# Patient Record
Sex: FEMALE | Race: ASIAN | NOT HISPANIC OR LATINO
[De-identification: names, ages, dates, MRNs, and addresses within clinical notes are randomized per-mention and may not be internally consistent; named-entity substitution may affect disease eponyms.]

---

## 2017-05-22 ENCOUNTER — RESULT CHARGE (OUTPATIENT)
Age: 47
End: 2017-05-22

## 2017-05-22 ENCOUNTER — APPOINTMENT (OUTPATIENT)
Dept: ENDOCRINOLOGY | Facility: CLINIC | Age: 47
End: 2017-05-22

## 2017-05-22 VITALS
DIASTOLIC BLOOD PRESSURE: 80 MMHG | WEIGHT: 128 LBS | OXYGEN SATURATION: 99 % | RESPIRATION RATE: 16 BRPM | BODY MASS INDEX: 23.55 KG/M2 | SYSTOLIC BLOOD PRESSURE: 120 MMHG | HEART RATE: 102 BPM | HEIGHT: 62 IN

## 2017-05-22 LAB — GLUCOSE BLDC GLUCOMTR-MCNC: 260

## 2017-06-13 ENCOUNTER — MED ADMIN CHARGE (OUTPATIENT)
Age: 47
End: 2017-06-13

## 2017-06-13 ENCOUNTER — APPOINTMENT (OUTPATIENT)
Dept: ENDOCRINOLOGY | Facility: CLINIC | Age: 47
End: 2017-06-13

## 2017-06-13 VITALS
BODY MASS INDEX: 24.29 KG/M2 | WEIGHT: 132 LBS | SYSTOLIC BLOOD PRESSURE: 120 MMHG | DIASTOLIC BLOOD PRESSURE: 80 MMHG | HEIGHT: 62 IN

## 2017-06-20 ENCOUNTER — APPOINTMENT (OUTPATIENT)
Dept: ENDOCRINOLOGY | Facility: CLINIC | Age: 47
End: 2017-06-20

## 2017-12-21 ENCOUNTER — APPOINTMENT (OUTPATIENT)
Dept: ENDOCRINOLOGY | Facility: CLINIC | Age: 47
End: 2017-12-21
Payer: MEDICAID

## 2017-12-21 VITALS
DIASTOLIC BLOOD PRESSURE: 80 MMHG | SYSTOLIC BLOOD PRESSURE: 120 MMHG | BODY MASS INDEX: 24.66 KG/M2 | HEIGHT: 62 IN | WEIGHT: 134 LBS

## 2017-12-21 LAB — GLUCOSE BLDC GLUCOMTR-MCNC: 282

## 2017-12-21 PROCEDURE — 99214 OFFICE O/P EST MOD 30 MIN: CPT | Mod: 25

## 2017-12-21 PROCEDURE — 82962 GLUCOSE BLOOD TEST: CPT

## 2018-02-02 LAB
ALBUMIN SERPL ELPH-MCNC: 4.1 G/DL
ALP BLD-CCNC: 58 U/L
ALT SERPL-CCNC: 17 U/L
ANION GAP SERPL CALC-SCNC: 9 MMOL/L
AST SERPL-CCNC: 17 U/L
BASOPHILS # BLD AUTO: 0.02 K/UL
BASOPHILS NFR BLD AUTO: 0.3 %
BILIRUB SERPL-MCNC: 0.2 MG/DL
BUN SERPL-MCNC: 22 MG/DL
CALCIUM SERPL-MCNC: 9.3 MG/DL
CHLORIDE SERPL-SCNC: 101 MMOL/L
CHOLEST SERPL-MCNC: 167 MG/DL
CHOLEST/HDLC SERPL: 5.2 RATIO
CO2 SERPL-SCNC: 28 MMOL/L
CREAT SERPL-MCNC: 0.78 MG/DL
CREAT SPEC-SCNC: 98 MG/DL
EOSINOPHIL # BLD AUTO: 0.35 K/UL
EOSINOPHIL NFR BLD AUTO: 5.3 %
GLUCOSE SERPL-MCNC: 170 MG/DL
HBA1C MFR BLD HPLC: 11.9 %
HCT VFR BLD CALC: 39.7 %
HDLC SERPL-MCNC: 32 MG/DL
HGB BLD-MCNC: 12.1 G/DL
IMM GRANULOCYTES NFR BLD AUTO: 0.3 %
LDLC SERPL CALC-MCNC: 62 MG/DL
LYMPHOCYTES # BLD AUTO: 1.81 K/UL
LYMPHOCYTES NFR BLD AUTO: 27.6 %
MAN DIFF?: NORMAL
MCHC RBC-ENTMCNC: 25.6 PG
MCHC RBC-ENTMCNC: 30.5 GM/DL
MCV RBC AUTO: 83.9 FL
MICROALBUMIN 24H UR DL<=1MG/L-MCNC: 0.8 MG/DL
MICROALBUMIN/CREAT 24H UR-RTO: 8 MG/G
MONOCYTES # BLD AUTO: 0.44 K/UL
MONOCYTES NFR BLD AUTO: 6.7 %
NEUTROPHILS # BLD AUTO: 3.92 K/UL
NEUTROPHILS NFR BLD AUTO: 59.8 %
PLATELET # BLD AUTO: 262 K/UL
POTASSIUM SERPL-SCNC: 4.2 MMOL/L
PROT SERPL-MCNC: 7.3 G/DL
RBC # BLD: 4.73 M/UL
RBC # FLD: 15 %
SODIUM SERPL-SCNC: 138 MMOL/L
TRIGL SERPL-MCNC: 367 MG/DL
TSH SERPL-ACNC: 2.61 UIU/ML
WBC # FLD AUTO: 6.56 K/UL

## 2018-02-06 ENCOUNTER — APPOINTMENT (OUTPATIENT)
Dept: ENDOCRINOLOGY | Facility: CLINIC | Age: 48
End: 2018-02-06

## 2018-02-20 ENCOUNTER — APPOINTMENT (OUTPATIENT)
Dept: ENDOCRINOLOGY | Facility: CLINIC | Age: 48
End: 2018-02-20
Payer: MEDICAID

## 2018-02-20 VITALS
WEIGHT: 132 LBS | BODY MASS INDEX: 24.29 KG/M2 | HEIGHT: 62 IN | SYSTOLIC BLOOD PRESSURE: 108 MMHG | DIASTOLIC BLOOD PRESSURE: 80 MMHG

## 2018-02-20 LAB — GLUCOSE BLDC GLUCOMTR-MCNC: 302

## 2018-02-20 PROCEDURE — 99214 OFFICE O/P EST MOD 30 MIN: CPT | Mod: 25

## 2018-02-20 PROCEDURE — 82962 GLUCOSE BLOOD TEST: CPT

## 2018-05-08 ENCOUNTER — MEDICATION RENEWAL (OUTPATIENT)
Age: 48
End: 2018-05-08

## 2018-05-10 ENCOUNTER — EMERGENCY (EMERGENCY)
Facility: HOSPITAL | Age: 48
LOS: 1 days | Discharge: HOME | End: 2018-05-10
Attending: EMERGENCY MEDICINE | Admitting: EMERGENCY MEDICINE
Payer: MEDICAID

## 2018-05-10 VITALS
DIASTOLIC BLOOD PRESSURE: 80 MMHG | RESPIRATION RATE: 18 BRPM | SYSTOLIC BLOOD PRESSURE: 148 MMHG | OXYGEN SATURATION: 100 % | TEMPERATURE: 98 F

## 2018-05-10 VITALS
TEMPERATURE: 98 F | SYSTOLIC BLOOD PRESSURE: 125 MMHG | HEART RATE: 90 BPM | RESPIRATION RATE: 18 BRPM | OXYGEN SATURATION: 100 % | DIASTOLIC BLOOD PRESSURE: 81 MMHG

## 2018-05-10 LAB
ALBUMIN SERPL ELPH-MCNC: 3.9 G/DL — SIGNIFICANT CHANGE UP (ref 3.3–5)
ALP SERPL-CCNC: 43 U/L — SIGNIFICANT CHANGE UP (ref 40–120)
ALT FLD-CCNC: 31 U/L — SIGNIFICANT CHANGE UP (ref 4–33)
APPEARANCE UR: CLEAR — SIGNIFICANT CHANGE UP
AST SERPL-CCNC: 33 U/L — HIGH (ref 4–32)
BASE EXCESS BLDV CALC-SCNC: 2.7 MMOL/L — SIGNIFICANT CHANGE UP
BASOPHILS # BLD AUTO: 0.03 K/UL — SIGNIFICANT CHANGE UP (ref 0–0.2)
BASOPHILS NFR BLD AUTO: 0.6 % — SIGNIFICANT CHANGE UP (ref 0–2)
BILIRUB SERPL-MCNC: < 0.2 MG/DL — LOW (ref 0.2–1.2)
BILIRUB UR-MCNC: NEGATIVE — SIGNIFICANT CHANGE UP
BLOOD GAS VENOUS - CREATININE: 0.61 MG/DL — SIGNIFICANT CHANGE UP (ref 0.5–1.3)
BLOOD UR QL VISUAL: NEGATIVE — SIGNIFICANT CHANGE UP
BUN SERPL-MCNC: 12 MG/DL — SIGNIFICANT CHANGE UP (ref 7–23)
CALCIUM SERPL-MCNC: 9.1 MG/DL — SIGNIFICANT CHANGE UP (ref 8.4–10.5)
CHLORIDE BLDV-SCNC: 108 MMOL/L — SIGNIFICANT CHANGE UP (ref 96–108)
CHLORIDE SERPL-SCNC: 101 MMOL/L — SIGNIFICANT CHANGE UP (ref 98–107)
CO2 SERPL-SCNC: 24 MMOL/L — SIGNIFICANT CHANGE UP (ref 22–31)
COLOR SPEC: SIGNIFICANT CHANGE UP
CREAT SERPL-MCNC: 0.6 MG/DL — SIGNIFICANT CHANGE UP (ref 0.5–1.3)
EOSINOPHIL # BLD AUTO: 0.12 K/UL — SIGNIFICANT CHANGE UP (ref 0–0.5)
EOSINOPHIL NFR BLD AUTO: 2.4 % — SIGNIFICANT CHANGE UP (ref 0–6)
GAS PNL BLDV: 139 MMOL/L — SIGNIFICANT CHANGE UP (ref 136–146)
GLUCOSE BLDV-MCNC: 134 — HIGH (ref 70–99)
GLUCOSE SERPL-MCNC: 140 MG/DL — HIGH (ref 70–99)
GLUCOSE UR-MCNC: >1000 — SIGNIFICANT CHANGE UP
HCO3 BLDV-SCNC: 26 MMOL/L — SIGNIFICANT CHANGE UP (ref 20–27)
HCT VFR BLD CALC: 32.6 % — LOW (ref 34.5–45)
HCT VFR BLDV CALC: 32.7 % — LOW (ref 34.5–45)
HGB BLD-MCNC: 10.2 G/DL — LOW (ref 11.5–15.5)
HGB BLDV-MCNC: 10.6 G/DL — LOW (ref 11.5–15.5)
IMM GRANULOCYTES # BLD AUTO: 0.01 # — SIGNIFICANT CHANGE UP
IMM GRANULOCYTES NFR BLD AUTO: 0.2 % — SIGNIFICANT CHANGE UP (ref 0–1.5)
KETONES UR-MCNC: NEGATIVE — SIGNIFICANT CHANGE UP
LACTATE BLDV-MCNC: 1.1 MMOL/L — SIGNIFICANT CHANGE UP (ref 0.5–2)
LEUKOCYTE ESTERASE UR-ACNC: HIGH
LYMPHOCYTES # BLD AUTO: 1.69 K/UL — SIGNIFICANT CHANGE UP (ref 1–3.3)
LYMPHOCYTES # BLD AUTO: 33.2 % — SIGNIFICANT CHANGE UP (ref 13–44)
MCHC RBC-ENTMCNC: 25.1 PG — LOW (ref 27–34)
MCHC RBC-ENTMCNC: 31.3 % — LOW (ref 32–36)
MCV RBC AUTO: 80.3 FL — SIGNIFICANT CHANGE UP (ref 80–100)
MONOCYTES # BLD AUTO: 0.66 K/UL — SIGNIFICANT CHANGE UP (ref 0–0.9)
MONOCYTES NFR BLD AUTO: 13 % — SIGNIFICANT CHANGE UP (ref 2–14)
MUCOUS THREADS # UR AUTO: SIGNIFICANT CHANGE UP
NEUTROPHILS # BLD AUTO: 2.58 K/UL — SIGNIFICANT CHANGE UP (ref 1.8–7.4)
NEUTROPHILS NFR BLD AUTO: 50.6 % — SIGNIFICANT CHANGE UP (ref 43–77)
NITRITE UR-MCNC: NEGATIVE — SIGNIFICANT CHANGE UP
NON-SQ EPI CELLS # UR AUTO: <1 — SIGNIFICANT CHANGE UP
NRBC # FLD: 0 — SIGNIFICANT CHANGE UP
OB PNL STL: NEGATIVE — SIGNIFICANT CHANGE UP
PCO2 BLDV: 51 MMHG — SIGNIFICANT CHANGE UP (ref 41–51)
PH BLDV: 7.35 PH — SIGNIFICANT CHANGE UP (ref 7.32–7.43)
PH UR: 6.5 — SIGNIFICANT CHANGE UP (ref 4.6–8)
PLATELET # BLD AUTO: 235 K/UL — SIGNIFICANT CHANGE UP (ref 150–400)
PMV BLD: 11.1 FL — SIGNIFICANT CHANGE UP (ref 7–13)
PO2 BLDV: 31 MMHG — LOW (ref 35–40)
POTASSIUM BLDV-SCNC: 3.4 MMOL/L — SIGNIFICANT CHANGE UP (ref 3.4–4.5)
POTASSIUM SERPL-MCNC: 4 MMOL/L — SIGNIFICANT CHANGE UP (ref 3.5–5.3)
POTASSIUM SERPL-SCNC: 4 MMOL/L — SIGNIFICANT CHANGE UP (ref 3.5–5.3)
PROT SERPL-MCNC: 7.4 G/DL — SIGNIFICANT CHANGE UP (ref 6–8.3)
PROT UR-MCNC: 10 MG/DL — SIGNIFICANT CHANGE UP
RBC # BLD: 4.06 M/UL — SIGNIFICANT CHANGE UP (ref 3.8–5.2)
RBC # FLD: 14.5 % — SIGNIFICANT CHANGE UP (ref 10.3–14.5)
RBC CASTS # UR COMP ASSIST: SIGNIFICANT CHANGE UP (ref 0–?)
SAO2 % BLDV: 51.9 % — LOW (ref 60–85)
SODIUM SERPL-SCNC: 138 MMOL/L — SIGNIFICANT CHANGE UP (ref 135–145)
SP GR SPEC: 1.02 — SIGNIFICANT CHANGE UP (ref 1–1.04)
SQUAMOUS # UR AUTO: SIGNIFICANT CHANGE UP
UROBILINOGEN FLD QL: NORMAL MG/DL — SIGNIFICANT CHANGE UP
WBC # BLD: 5.09 K/UL — SIGNIFICANT CHANGE UP (ref 3.8–10.5)
WBC # FLD AUTO: 5.09 K/UL — SIGNIFICANT CHANGE UP (ref 3.8–10.5)
WBC UR QL: SIGNIFICANT CHANGE UP (ref 0–?)

## 2018-05-10 PROCEDURE — 99284 EMERGENCY DEPT VISIT MOD MDM: CPT

## 2018-05-10 PROCEDURE — 76705 ECHO EXAM OF ABDOMEN: CPT | Mod: 26

## 2018-05-10 RX ORDER — SODIUM CHLORIDE 9 MG/ML
1000 INJECTION INTRAMUSCULAR; INTRAVENOUS; SUBCUTANEOUS ONCE
Qty: 0 | Refills: 0 | Status: COMPLETED | OUTPATIENT
Start: 2018-05-10 | End: 2018-05-10

## 2018-05-10 RX ORDER — ONDANSETRON 8 MG/1
4 TABLET, FILM COATED ORAL ONCE
Qty: 0 | Refills: 0 | Status: COMPLETED | OUTPATIENT
Start: 2018-05-10 | End: 2018-05-10

## 2018-05-10 RX ORDER — OMEPRAZOLE 10 MG/1
2 CAPSULE, DELAYED RELEASE ORAL
Qty: 84 | Refills: 0
Start: 2018-05-10 | End: 2018-06-20

## 2018-05-10 RX ORDER — FAMOTIDINE 10 MG/ML
1 INJECTION INTRAVENOUS
Qty: 40 | Refills: 0
Start: 2018-05-10 | End: 2018-05-29

## 2018-05-10 RX ORDER — FAMOTIDINE 10 MG/ML
20 INJECTION INTRAVENOUS ONCE
Qty: 0 | Refills: 0 | Status: COMPLETED | OUTPATIENT
Start: 2018-05-10 | End: 2018-05-10

## 2018-05-10 RX ADMIN — SODIUM CHLORIDE 1000 MILLILITER(S): 9 INJECTION INTRAMUSCULAR; INTRAVENOUS; SUBCUTANEOUS at 15:19

## 2018-05-10 RX ADMIN — ONDANSETRON 4 MILLIGRAM(S): 8 TABLET, FILM COATED ORAL at 15:19

## 2018-05-10 RX ADMIN — FAMOTIDINE 20 MILLIGRAM(S): 10 INJECTION INTRAVENOUS at 15:19

## 2018-05-10 NOTE — ED PROVIDER NOTE - MEDICAL DECISION MAKING DETAILS
Pratibha: Metabolic syndrome, epig pain and dark stool diarrhea. Not on ASA. TTP epig and RUQ. NAD. Afebrile. Vital signs unremarkable. DDx: PUD, gallstones, colitis. Plan: labs, US. If no gallstones, CT for transverse colitis.

## 2018-05-10 NOTE — ED PROVIDER NOTE - CARE PLAN
Principal Discharge DX:	Abdominal pain Principal Discharge DX:	Abdominal pain  Assessment and plan of treatment:	Follow up with Gastroenterology this week for outpatient endoscopy. Take Pepcid 20 mg 30 minutes before meals 2x/day.  Take over the counter Maalox as needed. Start Omeprazole 40 mg once a day. Stop eating spicy and acidic foods. Eat smaller meals more frequently. Discontinue taking aleve/ ibuprofen. Take Tylenol 650mg 1 tab every 4-6 hours.  Worsening pain, new fever, chills, nausea, vomiting, new chest pain/shortness of breath return to ER

## 2018-05-10 NOTE — ED ADULT TRIAGE NOTE - CHIEF COMPLAINT QUOTE
Pt with co fever and body aches since monday.  Pt reports dark stool since Tuesday with abdominal pain if she eats. Pts finger stick 227 in triage.

## 2018-05-10 NOTE — ED PROVIDER NOTE - ATTENDING CONTRIBUTION TO CARE
I performed a face-to-face evaluation of the patient and performed a history and physical examination. I agree with the history and physical examination.

## 2018-05-10 NOTE — ED PROVIDER NOTE - OBJECTIVE STATEMENT
47 yo F with pmhx IDDM, HTN, HLD, anxiety, presents to the ED c/o diffuse abdominal cramping, worse in the epigastric region, worse after eating, tactile fevers, improved with Tylenol, chills, body aches, nausea, 2 episodes of NBNB vomiting, and few episodes of diarrhea that are dark/ black in color. Pt reports taking aleve, every day for pain, denies asa use. Denies cp ,sob, recent sick contacts, weakness, urinary symptoms.   Pt had endoscopy 7 months ago, normal study as per study.   Last Colonoscopy 2012.

## 2018-05-10 NOTE — ED PROVIDER NOTE - PLAN OF CARE
Follow up with Gastroenterology this week for outpatient endoscopy. Take Pepcid 20 mg 30 minutes before meals 2x/day.  Take over the counter Maalox as needed. Start Omeprazole 40 mg once a day. Stop eating spicy and acidic foods. Eat smaller meals more frequently. Discontinue taking aleve/ ibuprofen. Take Tylenol 650mg 1 tab every 4-6 hours.  Worsening pain, new fever, chills, nausea, vomiting, new chest pain/shortness of breath return to ER

## 2018-05-29 ENCOUNTER — APPOINTMENT (OUTPATIENT)
Dept: GASTROENTEROLOGY | Facility: CLINIC | Age: 48
End: 2018-05-29
Payer: MEDICAID

## 2018-05-29 VITALS
SYSTOLIC BLOOD PRESSURE: 118 MMHG | HEIGHT: 62 IN | RESPIRATION RATE: 14 BRPM | HEART RATE: 98 BPM | WEIGHT: 134 LBS | TEMPERATURE: 98.7 F | DIASTOLIC BLOOD PRESSURE: 84 MMHG | BODY MASS INDEX: 24.66 KG/M2

## 2018-05-29 PROCEDURE — 99203 OFFICE O/P NEW LOW 30 MIN: CPT

## 2018-06-19 ENCOUNTER — FORM ENCOUNTER (OUTPATIENT)
Age: 48
End: 2018-06-19

## 2018-06-20 ENCOUNTER — OUTPATIENT (OUTPATIENT)
Dept: OUTPATIENT SERVICES | Facility: HOSPITAL | Age: 48
LOS: 1 days | End: 2018-06-20
Payer: MEDICAID

## 2018-06-20 ENCOUNTER — APPOINTMENT (OUTPATIENT)
Dept: NUCLEAR MEDICINE | Facility: HOSPITAL | Age: 48
End: 2018-06-20
Payer: MEDICAID

## 2018-06-20 DIAGNOSIS — E10.65 TYPE 1 DIABETES MELLITUS WITH HYPERGLYCEMIA: ICD-10-CM

## 2018-06-20 PROCEDURE — 78264 GASTRIC EMPTYING IMG STUDY: CPT | Mod: 26,GC

## 2018-06-20 PROCEDURE — A9541: CPT

## 2018-06-20 PROCEDURE — 78264 GASTRIC EMPTYING IMG STUDY: CPT

## 2018-07-09 ENCOUNTER — APPOINTMENT (OUTPATIENT)
Dept: ENDOCRINOLOGY | Facility: CLINIC | Age: 48
End: 2018-07-09
Payer: MEDICAID

## 2018-07-09 VITALS
HEART RATE: 95 BPM | HEIGHT: 62 IN | OXYGEN SATURATION: 98 % | RESPIRATION RATE: 16 BRPM | DIASTOLIC BLOOD PRESSURE: 73 MMHG | SYSTOLIC BLOOD PRESSURE: 104 MMHG | WEIGHT: 133 LBS | TEMPERATURE: 99 F | BODY MASS INDEX: 24.48 KG/M2

## 2018-07-09 LAB — GLUCOSE BLDC GLUCOMTR-MCNC: 271

## 2018-07-09 PROCEDURE — 82962 GLUCOSE BLOOD TEST: CPT

## 2018-07-09 PROCEDURE — 99214 OFFICE O/P EST MOD 30 MIN: CPT | Mod: 25

## 2018-07-10 LAB
ALBUMIN SERPL ELPH-MCNC: 4.5 G/DL
ALP BLD-CCNC: 54 U/L
ALT SERPL-CCNC: 44 U/L
ANION GAP SERPL CALC-SCNC: 15 MMOL/L
AST SERPL-CCNC: 30 U/L
BILIRUB SERPL-MCNC: <0.2 MG/DL
BUN SERPL-MCNC: 17 MG/DL
CALCIUM SERPL-MCNC: 9.6 MG/DL
CHLORIDE SERPL-SCNC: 103 MMOL/L
CHOLEST SERPL-MCNC: 179 MG/DL
CHOLEST/HDLC SERPL: 6 RATIO
CO2 SERPL-SCNC: 21 MMOL/L
CREAT SERPL-MCNC: 0.79 MG/DL
GLUCOSE SERPL-MCNC: 229 MG/DL
HBA1C MFR BLD HPLC: 10.2 %
HDLC SERPL-MCNC: 30 MG/DL
LDLC SERPL CALC-MCNC: NORMAL
POTASSIUM SERPL-SCNC: 4.2 MMOL/L
PROT SERPL-MCNC: 7.9 G/DL
SODIUM SERPL-SCNC: 139 MMOL/L
TRIGL SERPL-MCNC: 692 MG/DL

## 2018-08-07 ENCOUNTER — APPOINTMENT (OUTPATIENT)
Dept: GASTROENTEROLOGY | Facility: CLINIC | Age: 48
End: 2018-08-07
Payer: MEDICAID

## 2018-08-07 VITALS
TEMPERATURE: 98.8 F | SYSTOLIC BLOOD PRESSURE: 110 MMHG | OXYGEN SATURATION: 98 % | HEIGHT: 62 IN | HEART RATE: 94 BPM | BODY MASS INDEX: 24.48 KG/M2 | DIASTOLIC BLOOD PRESSURE: 74 MMHG | WEIGHT: 133 LBS | RESPIRATION RATE: 16 BRPM

## 2018-08-07 PROCEDURE — 99214 OFFICE O/P EST MOD 30 MIN: CPT

## 2018-08-07 RX ORDER — OMEPRAZOLE 40 MG/1
40 CAPSULE, DELAYED RELEASE ORAL
Qty: 30 | Refills: 3 | Status: ACTIVE | COMMUNITY
Start: 2018-08-07 | End: 1900-01-01

## 2018-08-07 RX ORDER — METOCLOPRAMIDE 5 MG/1
5 TABLET ORAL 4 TIMES DAILY
Qty: 120 | Refills: 0 | Status: ACTIVE | COMMUNITY
Start: 2018-08-07 | End: 1900-01-01

## 2018-12-11 ENCOUNTER — APPOINTMENT (OUTPATIENT)
Dept: ENDOCRINOLOGY | Facility: CLINIC | Age: 48
End: 2018-12-11
Payer: MEDICAID

## 2018-12-11 ENCOUNTER — APPOINTMENT (OUTPATIENT)
Dept: GASTROENTEROLOGY | Facility: CLINIC | Age: 48
End: 2018-12-11
Payer: MEDICAID

## 2018-12-11 VITALS
TEMPERATURE: 98.5 F | HEART RATE: 92 BPM | DIASTOLIC BLOOD PRESSURE: 90 MMHG | OXYGEN SATURATION: 99 % | WEIGHT: 138 LBS | RESPIRATION RATE: 16 BRPM | SYSTOLIC BLOOD PRESSURE: 131 MMHG | HEIGHT: 62 IN | BODY MASS INDEX: 25.4 KG/M2

## 2018-12-11 VITALS
DIASTOLIC BLOOD PRESSURE: 81 MMHG | WEIGHT: 139 LBS | SYSTOLIC BLOOD PRESSURE: 163 MMHG | HEART RATE: 92 BPM | BODY MASS INDEX: 25.58 KG/M2 | RESPIRATION RATE: 14 BRPM | TEMPERATURE: 98.1 F | HEIGHT: 62 IN | OXYGEN SATURATION: 98 %

## 2018-12-11 DIAGNOSIS — Z87.19 PERSONAL HISTORY OF OTHER DISEASES OF THE DIGESTIVE SYSTEM: ICD-10-CM

## 2018-12-11 DIAGNOSIS — E78.5 HYPERLIPIDEMIA, UNSPECIFIED: ICD-10-CM

## 2018-12-11 DIAGNOSIS — R19.4 CHANGE IN BOWEL HABIT: ICD-10-CM

## 2018-12-11 DIAGNOSIS — M79.641 PAIN IN RIGHT HAND: ICD-10-CM

## 2018-12-11 DIAGNOSIS — R14.0 ABDOMINAL DISTENSION (GASEOUS): ICD-10-CM

## 2018-12-11 DIAGNOSIS — E10.65 TYPE 1 DIABETES MELLITUS WITH HYPERGLYCEMIA: ICD-10-CM

## 2018-12-11 DIAGNOSIS — I10 ESSENTIAL (PRIMARY) HYPERTENSION: ICD-10-CM

## 2018-12-11 LAB — GLUCOSE BLDC GLUCOMTR-MCNC: 202

## 2018-12-11 PROCEDURE — 99215 OFFICE O/P EST HI 40 MIN: CPT

## 2018-12-11 PROCEDURE — 99204 OFFICE O/P NEW MOD 45 MIN: CPT | Mod: 25

## 2018-12-11 PROCEDURE — 82962 GLUCOSE BLOOD TEST: CPT

## 2018-12-11 RX ORDER — CANAGLIFLOZIN 100 MG/1
100 TABLET, FILM COATED ORAL ONCE
Qty: 30 | Refills: 0 | Status: COMPLETED | COMMUNITY
Start: 2017-05-22 | End: 2018-12-11

## 2018-12-11 RX ORDER — FENOFIBRATE 54 MG/1
54 TABLET ORAL DAILY
Qty: 90 | Refills: 0 | Status: COMPLETED | COMMUNITY
Start: 2018-02-20 | End: 2018-12-11

## 2018-12-11 RX ORDER — EMPAGLIFLOZIN 10 MG/1
10 TABLET, FILM COATED ORAL
Qty: 30 | Refills: 3 | Status: COMPLETED | COMMUNITY
Start: 2017-06-13 | End: 2018-12-11

## 2018-12-11 RX ORDER — CANAGLIFLOZIN 100 MG/1
100 TABLET, FILM COATED ORAL
Qty: 90 | Refills: 0 | Status: COMPLETED | COMMUNITY
Start: 2017-06-15 | End: 2018-12-11

## 2018-12-11 RX ORDER — CANAGLIFLOZIN 100 MG/1
100 TABLET, FILM COATED ORAL
Qty: 90 | Refills: 0 | Status: COMPLETED | COMMUNITY
Start: 2018-07-09 | End: 2018-12-11

## 2018-12-11 RX ORDER — EMPAGLIFLOZIN 10 MG/1
10 TABLET, FILM COATED ORAL
Qty: 30 | Refills: 2 | Status: COMPLETED | COMMUNITY
Start: 2018-02-20 | End: 2018-12-11

## 2018-12-11 RX ORDER — CANAGLIFLOZIN 100 MG/1
100 TABLET, FILM COATED ORAL
Qty: 90 | Refills: 0 | Status: COMPLETED | COMMUNITY
Start: 2017-12-21 | End: 2018-12-11

## 2018-12-11 RX ORDER — METFORMIN ER 500 MG 500 MG/1
500 TABLET ORAL
Qty: 180 | Refills: 0 | Status: COMPLETED | COMMUNITY
Start: 2018-02-20 | End: 2018-12-11

## 2018-12-15 ENCOUNTER — RX RENEWAL (OUTPATIENT)
Age: 48
End: 2018-12-15

## 2018-12-15 DIAGNOSIS — E55.9 VITAMIN D DEFICIENCY, UNSPECIFIED: ICD-10-CM

## 2018-12-15 RX ORDER — BLOOD-GLUCOSE METER
W/DEVICE KIT MISCELLANEOUS
Qty: 1 | Refills: 0 | Status: COMPLETED | COMMUNITY
Start: 2017-05-22 | End: 2018-12-15

## 2018-12-15 RX ORDER — OMEPRAZOLE 20 MG/1
20 CAPSULE, DELAYED RELEASE ORAL DAILY
Qty: 10 | Refills: 3 | Status: COMPLETED | COMMUNITY
Start: 2018-05-29 | End: 2018-12-15

## 2018-12-15 RX ORDER — INSULIN GLARGINE 100 [IU]/ML
100 INJECTION, SOLUTION SUBCUTANEOUS DAILY
Qty: 15 | Refills: 1 | Status: ACTIVE | COMMUNITY
Start: 2018-05-08

## 2018-12-15 RX ORDER — ALCOHOL ANTISEPTIC PADS
PADS, MEDICATED (EA) TOPICAL
Qty: 3 | Refills: 3 | Status: ACTIVE | COMMUNITY
Start: 2018-02-20

## 2018-12-15 RX ORDER — FAMOTIDINE 10 MG/1
10 TABLET, FILM COATED ORAL
Qty: 30 | Refills: 2 | Status: COMPLETED | COMMUNITY
Start: 2018-05-29 | End: 2018-12-15

## 2018-12-15 RX ORDER — METFORMIN ER 500 MG 500 MG/1
500 TABLET ORAL
Qty: 180 | Refills: 0 | Status: ACTIVE | COMMUNITY
Start: 2018-07-09

## 2018-12-15 RX ORDER — FENOFIBRATE 54 MG/1
54 TABLET ORAL DAILY
Qty: 90 | Refills: 0 | Status: ACTIVE | COMMUNITY
Start: 2018-07-09

## 2018-12-15 RX ORDER — LISINOPRIL 5 MG/1
5 TABLET ORAL DAILY
Qty: 90 | Refills: 0 | Status: ACTIVE | COMMUNITY
Start: 2018-02-20

## 2018-12-15 RX ORDER — FLASH GLUCOSE SENSOR
KIT MISCELLANEOUS
Qty: 10 | Refills: 3 | Status: ACTIVE | COMMUNITY
Start: 2018-07-09

## 2018-12-15 RX ORDER — FLASH GLUCOSE SENSOR
KIT MISCELLANEOUS
Qty: 1 | Refills: 0 | Status: ACTIVE | COMMUNITY
Start: 2018-07-09

## 2018-12-15 RX ORDER — PEN NEEDLE, DIABETIC 32 GX 1/6"
32G X 4 MM NEEDLE, DISPOSABLE MISCELLANEOUS
Qty: 4 | Refills: 3 | Status: COMPLETED | COMMUNITY
Start: 2018-02-20 | End: 2018-12-15

## 2018-12-15 RX ORDER — PEN NEEDLE, DIABETIC 32 GX 1/6"
32G X 4 MM NEEDLE, DISPOSABLE MISCELLANEOUS
Qty: 4 | Refills: 3 | Status: ACTIVE | COMMUNITY
Start: 2018-07-09

## 2018-12-15 RX ORDER — BLOOD SUGAR DIAGNOSTIC
STRIP MISCELLANEOUS 3 TIMES DAILY
Qty: 270 | Refills: 0 | Status: ACTIVE | COMMUNITY
Start: 2018-07-09

## 2018-12-20 ENCOUNTER — RX CHANGE (OUTPATIENT)
Age: 48
End: 2018-12-20

## 2018-12-20 LAB
ALBUMIN SERPL ELPH-MCNC: 4.5 G/DL
ALP BLD-CCNC: 64 U/L
ALT SERPL-CCNC: 80 U/L
ANION GAP SERPL CALC-SCNC: 12 MMOL/L
AST SERPL-CCNC: 50 U/L
BILIRUB DIRECT SERPL-MCNC: 0.1 MG/DL
BILIRUB INDIRECT SERPL-MCNC: 0.2 MG/DL
BILIRUB SERPL-MCNC: 0.3 MG/DL
BUN SERPL-MCNC: 15 MG/DL
CALCIUM SERPL-MCNC: 9.7 MG/DL
CHLORIDE SERPL-SCNC: 102 MMOL/L
CHOLEST SERPL-MCNC: 189 MG/DL
CHOLEST/HDLC SERPL: 5.4 RATIO
CK SERPL-CCNC: 171 U/L
CO2 SERPL-SCNC: 26 MMOL/L
CREAT SERPL-MCNC: 0.73 MG/DL
CREAT SPEC-SCNC: 113 MG/DL
FRUCTOSAMINE SERPL-MCNC: 390 UMOL/L
GLUCOSE BS SERPL-MCNC: 172 MG/DL
GLUCOSE SERPL-MCNC: 175 MG/DL
HBA1C MFR BLD HPLC: 10 %
HDLC SERPL-MCNC: 35 MG/DL
LDLC SERPL CALC-MCNC: 95 MG/DL
MICROALBUMIN 24H UR DL<=1MG/L-MCNC: 2.3 MG/DL
MICROALBUMIN/CREAT 24H UR-RTO: 20 MG/G
POTASSIUM SERPL-SCNC: 4.2 MMOL/L
PROT SERPL-MCNC: 7.4 G/DL
SODIUM SERPL-SCNC: 140 MMOL/L
T4 FREE SERPL-MCNC: 1.2 NG/DL
THYROPEROXIDASE AB SERPL IA-ACNC: <10 IU/ML
TRIGL SERPL-MCNC: 296 MG/DL
TSH SERPL-ACNC: 3.55 UIU/ML

## 2018-12-22 ENCOUNTER — RX RENEWAL (OUTPATIENT)
Age: 48
End: 2018-12-22

## 2018-12-22 RX ORDER — DAPAGLIFLOZIN 5 MG/1
5 TABLET, FILM COATED ORAL
Qty: 90 | Refills: 3 | Status: COMPLETED | COMMUNITY
Start: 2018-12-15 | End: 2018-12-22

## 2018-12-22 RX ORDER — EMPAGLIFLOZIN 10 MG/1
10 TABLET, FILM COATED ORAL
Qty: 90 | Refills: 3 | Status: COMPLETED | COMMUNITY
Start: 2018-12-20 | End: 2018-12-22

## 2019-01-14 ENCOUNTER — OTHER (OUTPATIENT)
Age: 49
End: 2019-01-14

## 2019-02-13 ENCOUNTER — RESULT REVIEW (OUTPATIENT)
Age: 49
End: 2019-02-13

## 2019-02-13 ENCOUNTER — OUTPATIENT (OUTPATIENT)
Dept: OUTPATIENT SERVICES | Facility: HOSPITAL | Age: 49
LOS: 1 days | End: 2019-02-13
Payer: MEDICAID

## 2019-02-13 DIAGNOSIS — K31.84 GASTROPARESIS: ICD-10-CM

## 2019-02-13 LAB — HCG UR QL: NEGATIVE — SIGNIFICANT CHANGE UP

## 2019-02-13 PROCEDURE — 43239 EGD BIOPSY SINGLE/MULTIPLE: CPT

## 2019-02-13 PROCEDURE — 88312 SPECIAL STAINS GROUP 1: CPT | Mod: 26

## 2019-02-13 PROCEDURE — 88305 TISSUE EXAM BY PATHOLOGIST: CPT

## 2019-02-13 PROCEDURE — 81025 URINE PREGNANCY TEST: CPT

## 2019-02-13 PROCEDURE — 88305 TISSUE EXAM BY PATHOLOGIST: CPT | Mod: 26

## 2019-02-13 PROCEDURE — 88312 SPECIAL STAINS GROUP 1: CPT

## 2019-03-12 ENCOUNTER — APPOINTMENT (OUTPATIENT)
Dept: GASTROENTEROLOGY | Facility: CLINIC | Age: 49
End: 2019-03-12
Payer: MEDICAID

## 2019-03-12 VITALS
RESPIRATION RATE: 14 BRPM | HEART RATE: 85 BPM | WEIGHT: 137 LBS | HEIGHT: 62 IN | DIASTOLIC BLOOD PRESSURE: 80 MMHG | SYSTOLIC BLOOD PRESSURE: 124 MMHG | TEMPERATURE: 98.4 F | BODY MASS INDEX: 25.21 KG/M2 | OXYGEN SATURATION: 98 %

## 2019-03-12 DIAGNOSIS — K31.84 GASTROPARESIS: ICD-10-CM

## 2019-03-12 DIAGNOSIS — K59.09 OTHER CONSTIPATION: ICD-10-CM

## 2019-03-12 PROCEDURE — 99214 OFFICE O/P EST MOD 30 MIN: CPT

## 2019-03-12 RX ORDER — POLYETHYLENE GLYCOL 3350 17 G/17G
17 POWDER, FOR SOLUTION ORAL DAILY
Qty: 1 | Refills: 5 | Status: ACTIVE | COMMUNITY
Start: 2019-03-12 | End: 1900-01-01

## 2019-03-12 RX ORDER — OMEPRAZOLE 40 MG/1
40 CAPSULE, DELAYED RELEASE ORAL
Qty: 30 | Refills: 3 | Status: ACTIVE | COMMUNITY
Start: 2019-03-12 | End: 1900-01-01

## 2019-03-12 NOTE — HISTORY OF PRESENT ILLNESS
[de-identified] : 48 year old female presents follow up. She states her symptoms are improved but still presents. She is working on lifestyle modifications and her blood glucose control.

## 2019-03-12 NOTE — ASSESSMENT
[FreeTextEntry1] : 48 year old female with chronic constipation  and bloating. + Gastroparesis confirmed on GE scan. HBA!C 10 on last testing. \par \par Plan:\par Small frequent meals \par PPI daily \par Reglan (patient refusing to try given possible side effects) \par Low fat meals\par better BG control

## 2019-03-12 NOTE — PHYSICAL EXAM
[General Appearance - Alert] : alert [General Appearance - In No Acute Distress] : in no acute distress [Sclera] : the sclera and conjunctiva were normal [PERRL With Normal Accommodation] : pupils were equal in size, round, and reactive to light [Extraocular Movements] : extraocular movements were intact [Outer Ear] : the ears and nose were normal in appearance [Oropharynx] : the oropharynx was normal [Neck Appearance] : the appearance of the neck was normal [Neck Cervical Mass (___cm)] : no neck mass was observed [Jugular Venous Distention Increased] : there was no jugular-venous distention [Thyroid Diffuse Enlargement] : the thyroid was not enlarged [Thyroid Nodule] : there were no palpable thyroid nodules [Auscultation Breath Sounds / Voice Sounds] : lungs were clear to auscultation bilaterally [Heart Rate And Rhythm] : heart rate was normal and rhythm regular [Heart Sounds] : normal S1 and S2 [Heart Sounds Gallop] : no gallops [Murmurs] : no murmurs [Heart Sounds Pericardial Friction Rub] : no pericardial rub [Bowel Sounds] : normal bowel sounds [Abdomen Soft] : soft [Abdomen Tenderness] : non-tender [Abdomen Mass (___ Cm)] : no abdominal mass palpated [No Spinal Tenderness] : no spinal tenderness [Nail Clubbing] : no clubbing  or cyanosis of the fingernails [] : no rash [Oriented To Time, Place, And Person] : oriented to person, place, and time [Impaired Insight] : insight and judgment were intact [Affect] : the affect was normal [FreeTextEntry1] : Distended

## 2019-04-16 ENCOUNTER — APPOINTMENT (OUTPATIENT)
Dept: ENDOCRINOLOGY | Facility: CLINIC | Age: 49
End: 2019-04-16
Payer: MEDICAID

## 2019-04-16 VITALS
WEIGHT: 137 LBS | DIASTOLIC BLOOD PRESSURE: 80 MMHG | TEMPERATURE: 98.3 F | RESPIRATION RATE: 16 BRPM | OXYGEN SATURATION: 98 % | SYSTOLIC BLOOD PRESSURE: 117 MMHG | HEIGHT: 62 IN | HEART RATE: 90 BPM | BODY MASS INDEX: 25.21 KG/M2

## 2019-04-16 DIAGNOSIS — E11.9 TYPE 2 DIABETES MELLITUS W/OUT COMPLICATIONS: ICD-10-CM

## 2019-04-16 LAB — GLUCOSE BLDC GLUCOMTR-MCNC: 267

## 2019-04-16 PROCEDURE — 99213 OFFICE O/P EST LOW 20 MIN: CPT | Mod: 25

## 2019-04-16 PROCEDURE — 82962 GLUCOSE BLOOD TEST: CPT

## 2019-04-16 NOTE — PHYSICAL EXAM
[Alert] : alert [No Acute Distress] : no acute distress [Normal Sclera/Conjunctiva] : normal sclera/conjunctiva [PERRL] : pupils equal, round and reactive to light [Normal Outer Ear/Nose] : the ears and nose were normal in appearance [Normal Hearing] : hearing was normal [No Neck Mass] : no neck mass was observed [No Respiratory Distress] : no respiratory distress [Normal Rate and Effort] : normal respiratory rhythm and effort [Normal PMI] : the apical impulse was normal [Normal Rate] : heart rate was normal  [Carotids Normal] : carotid pulses were normal with no bruits [Abdominal Aorta Normal] : the abdominal aorta was normal [No Motor Deficits] : the motor exam was normal [No Sensory Deficits] : the sensory exam was normal to light touch and pinprick [Normal Sensation on Monofilament Testing] : normal sensation on monofilament testing of lower extremities [de-identified] : Thyroid prominent irregular surface

## 2019-04-16 NOTE — DATA REVIEWED
[FreeTextEntry1] : The PPS is elevated, she could not get Jardiance or Invokana the insurance does not pay

## 2019-04-16 NOTE — HISTORY OF PRESENT ILLNESS
[FreeTextEntry1] : Doing well, unable to lose weight. She says she follows the diet and exercise. The POPS is elevated. She has seen the Ophthalmologist and Podiatrist

## 2019-04-16 NOTE — ASSESSMENT
[FreeTextEntry1] : Still poor diabetic control\par Will increase Steglatro 15 mg po QD\par Next visit if no improvement will add Trulicity\par Advised to see the Nutritionist

## 2019-05-14 ENCOUNTER — APPOINTMENT (OUTPATIENT)
Dept: GASTROENTEROLOGY | Facility: CLINIC | Age: 49
End: 2019-05-14

## 2019-06-11 ENCOUNTER — OTHER (OUTPATIENT)
Age: 49
End: 2019-06-11

## 2019-06-11 RX ORDER — FAMOTIDINE 20 MG/1
20 TABLET, FILM COATED ORAL
Qty: 30 | Refills: 3 | Status: ACTIVE | COMMUNITY
Start: 2018-08-07 | End: 1900-01-01

## 2019-07-23 ENCOUNTER — APPOINTMENT (OUTPATIENT)
Dept: ENDOCRINOLOGY | Facility: CLINIC | Age: 49
End: 2019-07-23

## 2019-10-28 ENCOUNTER — APPOINTMENT (OUTPATIENT)
Dept: ORTHOPEDIC SURGERY | Facility: CLINIC | Age: 49
End: 2019-10-28

## 2019-11-11 ENCOUNTER — APPOINTMENT (OUTPATIENT)
Dept: ORTHOPEDIC SURGERY | Facility: CLINIC | Age: 49
End: 2019-11-11
Payer: MEDICAID

## 2019-11-11 VITALS
SYSTOLIC BLOOD PRESSURE: 112 MMHG | WEIGHT: 135 LBS | HEART RATE: 94 BPM | BODY MASS INDEX: 24.84 KG/M2 | DIASTOLIC BLOOD PRESSURE: 76 MMHG | HEIGHT: 62 IN

## 2019-11-11 DIAGNOSIS — M75.101 UNSPECIFIED ROTATOR CUFF TEAR OR RUPTURE OF RIGHT SHOULDER, NOT SPECIFIED AS TRAUMATIC: ICD-10-CM

## 2019-11-11 DIAGNOSIS — E10.618 TYPE 1 DIABETES MELLITUS WITH OTHER DIABETIC ARTHROPATHY: ICD-10-CM

## 2019-11-11 DIAGNOSIS — M75.00 TYPE 1 DIABETES MELLITUS WITH OTHER DIABETIC ARTHROPATHY: ICD-10-CM

## 2019-11-11 PROCEDURE — 20610 DRAIN/INJ JOINT/BURSA W/O US: CPT | Mod: RT

## 2019-11-11 PROCEDURE — 99204 OFFICE O/P NEW MOD 45 MIN: CPT | Mod: 25

## 2019-11-11 PROCEDURE — 73030 X-RAY EXAM OF SHOULDER: CPT | Mod: RT

## 2019-11-11 RX ORDER — DICLOFENAC SODIUM AND MISOPROSTOL 75; 200 MG/1; UG/1
75-0.2 TABLET, DELAYED RELEASE ORAL
Qty: 60 | Refills: 1 | Status: ACTIVE | COMMUNITY
Start: 2019-11-11 | End: 1900-01-01

## 2019-11-11 NOTE — HISTORY OF PRESENT ILLNESS
[de-identified] : CC Right shoulder\par \par HPI 48 yo female left HD presents with gradual onset of 6 months to one year of activity related pain in the entire Right shoulder [without injury]. The pain is worse, and rated a 6 out of 10, described as aching sharp burning throbbing, with radiation to the entire shoulder. Not moving the shoulder makes the pain better and shoulder motion makes the pain worse. The patient reports associated symptoms of loss of range of motion locking. The patient + pain at night affecting sleep, - neck pain, and - similar pain previously.\par \par The patient has tried the following treatments:\par Activity modification	-\par Ice			-\par Nsaids    		-\par Physical Therapy  	-\par Cortisone Injection	-\par Arthroscopy/Surgery	-\par \par Review of Systems is positive for the above musculoskeletal symptoms and is otherwise non-contributory for general, constitutional, psychiatric, neurologic, HEENT, cardiac, respiratory, gastrointestinal, reproductive, lymphatic, and dermatologic complaints.\par \par Consult by Dr Clifton

## 2019-11-11 NOTE — PHYSICAL EXAM
[de-identified] : Physical Examination\par General: well nourished, in no acute distress, alert and oriented to person, place and time\par Psychiatric: normal mood and affect, no abnormal movements or speech patterns\par Eyes: vision intact - glasses\par Throat: no thyromegaly\par Lymph: no enlarged nodes, no lymphedema in extremity\par Respiratory: no wheezing, no shortness of breath with ambulation\par Cardiac: no cardiac leg swelling, 2+ peripheral pulses\par Neurology: normal gross sensation in extremities to light touch\par Abdomen: soft, non-tender, tympanic, no masses\par \par Musculoskeletal Examination\par Cervical spine	Full painless range of motion and negative Spurling's test\par \par Shoulder			Right			Left\par Appearance\par      Skin/Swelling/Deformity	normal			normal\par      Scapular Winging		-			-\par Range of Motion\par      Forward Flexion		100 / 100		170 / 170\par      Abduction			40 / 40		170 / 170\par      External Rotation		0			45\par      Internal Rotation		post hip			T10\par      SAbd Ext Rotation		90&			90\par      SAbd Int Rotation		80&			80\par      Painful Arc			+			-\par      Crepitus			-			-\par Palpation\par      Clavicle			-			-\par      AC Joint			-			-\par      Posterior Acromion		-			-\par      Levator Scapula		-			-\par      Lateral Bursa			+			-\par      Impingement Area		+			-\par      Biceps Tendon		-			-\par      Anterior Capsule		-			-\par Strength Examination\par      Supraspinatous 		5+ / +			5+ / 0\par      Infraspinatous			5+ / +			5+ / 0\par      Subscapularis			5+ / +			5+ / 0\par      Belly Press			5+ / 0			5+ / 0\par      Lift Off			-			-\par      Drop-Arm			-			-\par Special Examination\par      Biceps Glenwood's		+			-\par      Impingement Neer		-&			-\par      Impingement Hawking		-&			-\par \par Sensation\par      Axillary			normal			normal\par      LatAntCubBrach 		normal			normal\par      Median 			normal			normal\par      Ulnar 			normal			normal\par      Radial 			normal			normal\par Motor\par      AIN 				normal			normal\par      Ulnar 			normal			normal\par      Radial 			normal			normal\par      PIN 				normal			normal\par Pulses\par      Radial			2+			2+ [de-identified] : 4 views of the affected Right shoulder (AP, Glenoid, Y-View, Axillary)\par were ordered, obtained and evaluated by myself today and\par demonstrate:\par normal bony calcification without dislocation and no fracture\par 	Arch	2B\par 	AC Joint	no Arthrosis\par 	GH Joint	no Arthrosis\par 	Calcifications	none\par \par MRI shoulder right dated 9-17-19\par \par No images\par \par Formal impression\par Small low-grade partial thickness partial with articular surface tear of the supraspinatus\par No other tears rotator cuff, no full thickness tear, no rotator cuff arthropathy.\par \par Moderate partial tearing and long segment entry ganglion cyst formation within the extra-articular biceps tendon. Additional moderate tendinosis and partial tearing of the intra-articular biceps and as well.\par \par Possibly SLAP tear involving the superior and posterior and anterior superior quadrant labrum was proposed moderate intrasubstance degenerative changes as well.\par \par Mild subacromial/subdeltoid bursitis.\par \par Findings many be suggestive of adhesive capsulitis in the clinical setting

## 2019-11-11 NOTE — DISCUSSION/SUMMARY
[de-identified] : Right shoulder adhesive capsulitis with type 1 diabetes\par \par I discussed the etiology, pathology and expected natural course of the frozen shoulder with sudden with minimal trauma onset of worsening pain in the shoulder and progressive loss of range of motion. This is a result of a sudden inflammatory response within the capsule of the joint which stimulates inflammation and severe pain within the shoulder as experienced by the patient and causes thickening of the joint capsule. This inflammatory thickening of the joint capsule will progress to worsening loss of range of motion of the shoulder with continued worsening of the inflammatory pain. Eventually the inflammatory process burns itself out and inflammation resolves and the pain improves.  However the shoulder remains "frozen" with decreased range of motion due to the scarred and thickened joint capsule. With with time and physical therapy and motion of the shoulder the thickened and scarred joint capsule will begin to loosen and the patient will feel continued improvement of the symptoms in the shoulder with improved motion over time.\par \par The entire course of this disease can take 1-2 years to fully resolve in patients without diabetes. In patients with diabetes is problem can take 2-3 years to resolve with more severe pain and loss of motion in the interim and less complete resolution of pain and loss of motion at the end of the disease process.\par \par The patient was prescribed Diclofenac PO non-steroidal anti-inflammatory medication. 50mg tablets twice daily to be taken for at least 1-2 weeks in a row and then PRN afterwards. Risks and benefits were discussed and include but not limited to renal damage and GI ulceration and bleeding.  They were advised to take with food to limit stomach upset as well as warned to stop the medication if worsening gastric pain or dizziness or other side effects. Also to immediately stop the medication and seek appropriate medical attention if any severe stomach ache, gastritis, black/red vomit, black/red stools or any other medical concern.\par \par The treatment consists largely of non-operative management including, patient education, activity modification, maintaining ROM of the shoulder w exercises and physical therapy, addressing the inflammation with anti-inflammatory medications consisting of oral non-steroidal anti-inflammatory medications and an intra-articular/subacromial bursal sorticosteroid injection.\par \par Physical therapy prescription was provided\par \par Discussed possible corticosteroid injection into the shoulder but deferred due to the patient's diabetic status\par \par Procedure Note:\par \par Risks and benefits of an arthrocentesis and intraarticular ketorolac injection of the RIGHT shoulder glenohumeral joint were discussed with the patient. Potential adverse effects were discussed including but not limited to bleeding, skin/ joint infection, local skin reactions including bleaching, bruising, stiffness, soreness, vasovagal episodes, transient hyperglycemia, avascular necrosis, pseudo-septic type reactions, post injection joint pain, allergic reaction to product or anesthetic and other rare but potential adverse effects along with benefits including decreased pain and improved stability prior to obtaining verbal informed consent. It was also discussed that for some patients the treatment is ineffective and there are no guarantees that the patient will experience improvement as the result of the injection. In rare occasions the injection can cause worsening of pain.\par \par After verbal consent, the nevaiser injection site was identified after palpating the soft spot based on the junction of the bony landmarks of the posterior clavicle, medial acromion and the AC joint. The injection site was marked and prepped with a ChloraPrep swab and anesthetized with ethylchloride skin anesthesia. Under sterile technique a 22g 1 1/2 in needle with 4 cc total of 1cc 1% lidocaine without epinephrine, 1cc 0.25% Marcaine without epinephrine and 2cc of Ketorolac 60mg/cc was passed through the injection site towards the glenohumeral joint. The medication was injected without resistance. The injection site was sterilely dressed, there was minimal blood loss. The patient tolerated this procedure without any complications done by myself.\par \par The patient has been advised that if they notice any worsening of symptoms or any problems to contact me and seek care from a qualified medical professional. The patient was instructed to ice the shoulder and take NSAID medication on an as needed basis if the patient feels discomfort.\par \par \par \par The patient verifies their understanding the the visit, diagnosis and plan. They agree with the treatment plan and will contact the office with any questions or problems.\par \par Followup p.r.n.\par

## 2019-11-12 ENCOUNTER — OTHER (OUTPATIENT)
Age: 49
End: 2019-11-12

## 2019-11-12 RX ORDER — DICLOFENAC SODIUM 50 MG/1
50 TABLET, DELAYED RELEASE ORAL
Qty: 60 | Refills: 0 | Status: ACTIVE | COMMUNITY
Start: 2019-11-12 | End: 1900-01-01

## 2019-11-26 ENCOUNTER — MEDICATION RENEWAL (OUTPATIENT)
Age: 49
End: 2019-11-26

## 2019-11-27 RX ORDER — ERTUGLIFLOZIN 15 MG/1
15 TABLET, FILM COATED ORAL
Qty: 90 | Refills: 3 | Status: ACTIVE | COMMUNITY
Start: 2018-12-22 | End: 1900-01-01

## 2019-12-31 ENCOUNTER — EMERGENCY (EMERGENCY)
Facility: HOSPITAL | Age: 49
LOS: 1 days | Discharge: LEFT BEFORE DISCHARGE | End: 2019-12-31
Admitting: EMERGENCY MEDICINE

## 2019-12-31 VITALS
HEART RATE: 78 BPM | RESPIRATION RATE: 15 BRPM | SYSTOLIC BLOOD PRESSURE: 135 MMHG | OXYGEN SATURATION: 99 % | DIASTOLIC BLOOD PRESSURE: 82 MMHG

## 2019-12-31 VITALS
DIASTOLIC BLOOD PRESSURE: 86 MMHG | HEART RATE: 84 BPM | RESPIRATION RATE: 15 BRPM | SYSTOLIC BLOOD PRESSURE: 145 MMHG | OXYGEN SATURATION: 96 % | TEMPERATURE: 98 F

## 2019-12-31 NOTE — ED ADULT NURSE NOTE - EXPLANATION OF PATIENT'S REASON FOR LEAVING
Pt states she feels better and does not want to wait any longer to see the doctor. Pt ambulating out of ER with family members.

## 2022-03-23 ENCOUNTER — EMERGENCY (EMERGENCY)
Facility: HOSPITAL | Age: 52
LOS: 1 days | Discharge: HOME | End: 2022-03-23
Attending: EMERGENCY MEDICINE | Admitting: EMERGENCY MEDICINE
Payer: COMMERCIAL

## 2022-03-23 VITALS
SYSTOLIC BLOOD PRESSURE: 146 MMHG | OXYGEN SATURATION: 100 % | TEMPERATURE: 99 F | DIASTOLIC BLOOD PRESSURE: 114 MMHG | HEART RATE: 98 BPM | RESPIRATION RATE: 16 BRPM

## 2022-03-23 PROCEDURE — 99284 EMERGENCY DEPT VISIT MOD MDM: CPT

## 2022-03-23 NOTE — ED ADULT TRIAGE NOTE - CHIEF COMPLAINT QUOTE
Pt sent by PCP with  abd distension, feeling gas , and constipation.  Pt s/o endoscopy 2 weeks ago Pt sent by PCP with  abd distension, feeling gas , and constipation.  Pt s/o endoscopy 2 weeks ago  Pt with dexcom to R deltoid area

## 2022-03-23 NOTE — ED ADULT NURSE NOTE - CHIEF COMPLAINT QUOTE
Pt sent by PCP with  abd distension, feeling gas , and constipation.  Pt s/o endoscopy 2 weeks ago  Pt with dexcom to R deltoid area

## 2022-03-23 NOTE — ED PROVIDER NOTE - PHYSICAL EXAMINATION
Dr Herrera  nontoxic female. mmm.   normal S1-S2  No resp distress. able to speak in full and clear sentences. no wheeze, rales or stridor.  soft obese female nt abdomen. no guarding or rebound  no pedal edema. no calf tenderness. normal pulses bilateral feet.

## 2022-03-23 NOTE — ED PROVIDER NOTE - OBJECTIVE STATEMENT
Dr Herrera  52yoF hx of HTN, HLD, IDDM, gastroparesis  from home co gas and constipation for  " long time" endorse. Feels "gas bubbling up" occasional nausea, vomiting. Had an endoscopy two weeks ago, dx gastroparesis. No n/v/d no abdominal pain today. Tolerating po. passing gas. States today she was sent in by her gastroenterologist. Dr Aguayo, [phone], notes ?colonscopy.

## 2022-03-23 NOTE — ED PROVIDER NOTE - PROGRESS NOTE DETAILS
Spoke with Pablo from Dr Aguayo office, pending callback Dr Aguayo states he referred her to "a [facility] gastroenterology office" She will need a gastric empty study and a colonoscopy. Given pt no complaints at this time, tolerating po. Has no pain. no n/v/d Having BM. taking reglan at home prn. will cancel labs and xray. have discharge longue  help to get an apt w gastroenterology at [facility]. pt aware of plan and comfortable w discharge wo labs/xr. Strict return instructions given.

## 2022-03-23 NOTE — ED PROVIDER NOTE - PATIENT PORTAL LINK FT
You can access the FollowMyHealth Patient Portal offered by [facility] by registering at the following website: [email or web address]. By joining [employer]’s FollowMyHealth portal, you will also be able to view your health information using other applications (apps) compatible with our system.

## 2022-04-13 ENCOUNTER — APPOINTMENT (OUTPATIENT)
Dept: GASTROENTEROLOGY | Facility: CLINIC | Age: 52
End: 2022-04-13

## 2022-05-11 ENCOUNTER — APPOINTMENT (OUTPATIENT)
Dept: GASTROENTEROLOGY | Facility: CLINIC | Age: 52
End: 2022-05-11
Payer: COMMERCIAL

## 2022-05-11 VITALS — DIASTOLIC BLOOD PRESSURE: 91 MMHG | SYSTOLIC BLOOD PRESSURE: 159 MMHG

## 2022-05-11 VITALS
DIASTOLIC BLOOD PRESSURE: 92 MMHG | OXYGEN SATURATION: 98 % | HEART RATE: 93 BPM | WEIGHT: 140 LBS | SYSTOLIC BLOOD PRESSURE: 172 MMHG | TEMPERATURE: 96.8 F | HEIGHT: 62 IN | BODY MASS INDEX: 25.76 KG/M2

## 2022-05-11 DIAGNOSIS — R11.0 NAUSEA: ICD-10-CM

## 2022-05-11 DIAGNOSIS — Z12.11 ENCOUNTER FOR SCREENING FOR MALIGNANT NEOPLASM OF COLON: ICD-10-CM

## 2022-05-11 DIAGNOSIS — Z01.818 ENCOUNTER FOR OTHER PREPROCEDURAL EXAMINATION: ICD-10-CM

## 2022-05-11 DIAGNOSIS — R07.89 OTHER CHEST PAIN: ICD-10-CM

## 2022-05-11 PROCEDURE — 99204 OFFICE O/P NEW MOD 45 MIN: CPT

## 2022-05-11 RX ORDER — POLYETHYLENE GLYCOL 3350 AND ELECTROLYTES WITH LEMON FLAVOR 236; 22.74; 6.74; 5.86; 2.97 G/4L; G/4L; G/4L; G/4L; G/4L
236 POWDER, FOR SOLUTION ORAL
Qty: 1 | Refills: 0 | Status: ACTIVE | COMMUNITY
Start: 2022-05-11 | End: 1900-01-01

## 2022-05-11 RX ORDER — LINACLOTIDE 145 UG/1
145 CAPSULE, GELATIN COATED ORAL
Qty: 30 | Refills: 0 | Status: ACTIVE | COMMUNITY
Start: 2022-05-11 | End: 1900-01-01

## 2022-05-11 NOTE — HISTORY OF PRESENT ILLNESS
[None] : had no significant interval events [Vomiting] : denies vomiting [Diarrhea] : denies diarrhea [Yellow Skin Or Eyes (Jaundice)] : denies jaundice [Rectal Pain] : denies rectal pain [Wt Gain ___ Lbs] : recent [unfilled] ~Upound(s) weight gain [Heartburn] : heartburn [Nausea] : nausea [Constipation] : constipation [Abdominal Pain] : abdominal pain [Abdominal Swelling] : abdominal swelling [GERD] : gastroesophageal reflux disease [Wt Loss ___ Lbs] : no recent weight loss [Hiatus Hernia] : no hiatus hernia [Peptic Ulcer Disease] : no peptic ulcer disease [Pancreatitis] : no pancreatitis [Cholelithiasis] : no cholelithiasis [Kidney Stone] : no kidney stone [Inflammatory Bowel Disease] : no inflammatory bowel disease [Irritable Bowel Syndrome] : no irritable bowel syndrome [Diverticulitis] : no diverticulitis [Alcohol Abuse] : no alcohol abuse [Malignancy] : no malignancy [Abdominal Surgery] : no abdominal surgery [Appendectomy] : no appendectomy [Cholecystectomy] : no cholecystectomy [de-identified] : The patient is a 52-year-old female with past medical history significant for hypertension, hypercholesterolemia and diabetes mellitus who was referred to my office by Dr. Cristal Cox ([phone]) for dyspepsia, gastroesophageal reflux disease, dysphagia, atypical chest pain and nausea. The patient also admits to having constipation, change in bowel habits, change in caliber of stool and lower GI bleeding. The patient also came to the office for evaluation for colon cancer screening.  I was asked to render an opinion for consultation for the above complaints.   The patient states that she is feeling uncomfortable.  The patient was evaluated at [facility] emergency room on 2022 for abdominal discomfort and abdominal gas and bloating.  Patient admits to a history of gastroparesis patient had a prior upper endoscopy performed to assess the symptoms.  The patient had blood work performed in the emergency room to assess the symptoms.  The blood work revealed an elevated blood glucose of 254 mg/dL.  The patient denies any abdominal pain.  The patient complains of abdominal gas and bloating.  The patient complains of nausea but denies any vomiting.  The patient complains of gastroesophageal reflux disease and dysphagia. The dysphagia occurs with both solids and liquids.  The gastroesophageal reflux disease is worse after meals and late at night and in the early morning.  The gastroesophageal reflux disease is slightly improved with proton pump inhibitors, H2 blockers and antacids.  The patient complains of atypical chest pain but denies any shortness of breath or palpitations. The patient had been followed by her cardiologist.  According to the patient, the cardiac status is stable.   The chest pain is described as a pressure, intermittent substernal discomfort that is nonradiating in nature.  The patient admits to occasional episodes of diaphoresis.  The chest pain is described as 6 to 7 out of 10 in intensity.  The chest pain can occur at any time.  The chest pain is worse at night and early morning.  The chest pain is worse after meals and improves with passing gas.  The chest pain awakened the patient from sleep. The patient complains of constipation but denies any diarrhea.  The patient has 1 bowel movement every 3 to 4 days. The patient takes Senna and Miralax for the constipation.   The patient complains of a change in bowel habits.  The patient complains of a change in caliber of stool.   The patient denies having mucus discharge with the bowel movements.  The patient complains of occasional rectal bleeding but denies any melena or hematemesis.  The rectal bleeding is associated with constipation and internal hemorrhoids.  The patient complains of rectal discomfort and rectal pruritus. The patient denies any weight loss or anorexia.  The patient admits to gaining weight recently. She denies any fevers or chills.  The patient denies any jaundice or pruritus.  The patient complains of occasional lower back pain.  The patient had an upper endoscopy at the [facility] endoscopy suite on 2019. The upper endoscopy was performed up to the level of the second portion of the duodenum. The upper endoscopy revealed very mild gastritis. Biopsies were taken of the distal esophagus, antrum, body of stomach and duodenum to assess for esophagitis, gastritis and duodenitis. The pathology revealed esophageal mucosa with unremarkable squamous mucosa and detached fragments of mildly inflamed gastric type gastric mucosa (esophagus), moderate to severe chronic active gastritis that was negative for Helicobacter pylori (antrum and body of the stomach) and duodenal mucosa without significant pathology (duodenum).  The patient tolerated the procedure well.  The patient admits to having a prior upper endoscopy performed by another gastroenterologist, Dr. Ez Aguayo ([phone]).  According to the patient, the upper endoscopic findings revealed gastritis and gastroparesis.  The patient is currently on Reglan for the gastroparesis. The patient's last menstrual period was age 50. The patient is a .  The patient's first menstrual period was at age 14. The patient admits to a family history of GI problems.  The patient’s father had a history of stomach cancer. [de-identified] : (-) smoking, (-) ETOH, (-) IVDA\par

## 2022-05-11 NOTE — ASSESSMENT
[FreeTextEntry1] : Dyspepsia: The patient complains of dyspeptic symptoms.  The patient was advised to abide by an anti-gas (low FOD-MAP) diet.  The patient was given a pamphlet for anti-gas (low FOD-MAP).  The patient and I reviewed the anti-gas (low FOD-MAP) diet at length. The patient is to start on a trial of Simethicone one tablet 4 times a day p.r.n. abdominal pain and gas.\par GERD: The patient was advised to avoid late-night meals and dietary indiscretions.  The patient was advised to avoid fried and fatty foods.  The patient was advised to abide by an anti-GERD diet. The patient was given a pamphlet for anti-GERD.  The patient and I reviewed the anti-GERD diet at length. I recommend a trial of famotidine 40 mg twice a day x 3 months for the symptoms.\par Nausea: The patient complains of nausea. The patient is currently on Reglan for the If the symptoms of nausea persists, the patient may require a trial of Zofran 4 mg twice a day. \par Atypical Chest Pain: The patient complains of atypical chest pain of unclear etiology.  The patient was advised to follow up with the PMD and cardiologist regarding evaluation for the atypical chest pain. The patient was told of possible etiologies such as cardiac, pulmonary, GI, musculoskeletal, stress and other causes for the atypical chest pain.  The patient agrees and will follow-up with the PMD and cardiologist. \par Dysphagia: The patient complains of dysphagia of unclear etiology but most likely secondary to gastroparesis.   The dysphagia is worse with meals but not with liquids.     The patient agrees and will follow-up for further work-up and treatment.\par Constipation: The patient complains of constipation. I recommend a high-fiber diet. I recommend a trial of a probiotic such as Align once a day. I recommend a trial of Metamucil once a day for fiber supplementation. I recommend a trial of Linzess 145 mcg once a day for the constipation.  The patient agreed and will followup to reassess the symptoms.  \par Colon Cancer screening: I recommend colonoscopy for colon cancer screening over the age of 45 to assess for colonic polyps. The patient was told of the risks and benefits of the procedure.  The patient was told of the risks of perforation, emergency surgery, bleeding, infections and missed lesions. The patient is to be on a clear liquid diet the entire day prior to the procedure. The patient is to complete the entire prescribed bowel prep the day prior to the procedure as directed. The patient is to have a COVID 19 PCR nasopharyngeal swab performed at the approved sites 3 days prior to the procedure.  The patient is told not to drive, drink alcohol, use recreational drugs, exercise, or work the day of the procedure.  The patient was told of the need for an escort to accompany the patient home after the procedure. The patient is aware that the procedure may be cancelled if they fail to follow the directions.  The patient agreed and will schedule for the procedure. The patient can take the antihypertensive medication with a sip of water one hour prior to the procedure. The patient is to hold the diabetic medication the day before and the morning of the procedure.. The patient is to be n.p.o. after midnight and bowel prep was given.  The patient is to return for the procedure.\par Colonoscopy: I recommend a colonoscopy to assess the symptoms and for colonic polyps.  The patient was told of the risks and benefits of the procedure.  The patient was told of the risks of perforation, emergency surgery, bleeding, infections and missed lesions.  The patient is to be on a clear liquid diet the entire day prior to the procedure. The patient is to complete the entire prescribed bowel prep the day prior to the procedure as directed. The patient is to have a COVID 19 PCR nasopharyngeal swab performed at the approved sites 3 days prior to the procedure.  The patient is told not to drive, drink alcohol, use recreational drugs, exercise, or work the day of the procedure.  The patient was told of the need for an escort to accompany the patient home after the procedure. The patient is aware that the procedure may be cancelled if they fail to follow the directions.  The patient agreed and will schedule for the procedure. The patient can take the antihypertensive medication with a sip of water one hour prior to the procedure. The patient is to hold the diabetic medication the day before and the morning of the procedure. The patient is to be n.p.o. after midnight and bowel prep was given.  The patient is to return for the procedure.\par Prior Endoscopic Procedures: The patient had a prior upper endoscopy performed by another gastroenterologist.  I will try to obtain the prior upper endoscopy  reports.  The patient is to sign a record release to obtain those records.\par Follow-up: The patient is to follow-up in the office in 4 weeks to reassess the symptoms. The patient was told to call the office if any further problems. \par \par \par \par

## 2022-05-11 NOTE — REVIEW OF SYSTEMS
[Feeling Tired] : feeling tired [Eyesight Problems] : eyesight problems [Chest Pain] : chest pain [SOB on Exertion] : shortness of breath during exertion [Constipation] : constipation [Heartburn] : heartburn [Arthralgias] : arthralgias [Anxiety] : anxiety [Depression] : depression [Hot Flashes] : hot flashes [Negative] : Heme/Lymph [FreeTextEntry9] : frozen shoulder

## 2022-07-11 LAB — SARS-COV-2 N GENE NPH QL NAA+PROBE: NOT DETECTED

## 2022-07-12 ENCOUNTER — TRANSCRIPTION ENCOUNTER (OUTPATIENT)
Age: 52
End: 2022-07-12

## 2022-07-12 ENCOUNTER — APPOINTMENT (OUTPATIENT)
Dept: GASTROENTEROLOGY | Facility: HOSPITAL | Age: 52
End: 2022-07-12

## 2022-07-12 ENCOUNTER — RESULT REVIEW (OUTPATIENT)
Age: 52
End: 2022-07-12

## 2022-07-12 ENCOUNTER — OUTPATIENT (OUTPATIENT)
Dept: OUTPATIENT SERVICES | Facility: HOSPITAL | Age: 52
LOS: 1 days | End: 2022-07-12
Payer: COMMERCIAL

## 2022-07-12 VITALS
SYSTOLIC BLOOD PRESSURE: 145 MMHG | DIASTOLIC BLOOD PRESSURE: 82 MMHG | TEMPERATURE: 98 F | WEIGHT: 145.06 LBS | RESPIRATION RATE: 14 BRPM | OXYGEN SATURATION: 99 % | HEIGHT: 63 IN | HEART RATE: 85 BPM

## 2022-07-12 VITALS — DIASTOLIC BLOOD PRESSURE: 74 MMHG | SYSTOLIC BLOOD PRESSURE: 128 MMHG | RESPIRATION RATE: 15 BRPM | HEART RATE: 81 BPM

## 2022-07-12 DIAGNOSIS — Z12.11 ENCOUNTER FOR SCREENING FOR MALIGNANT NEOPLASM OF COLON: ICD-10-CM

## 2022-07-12 LAB — GLUCOSE BLDC GLUCOMTR-MCNC: 209 MG/DL — HIGH (ref 70–99)

## 2022-07-12 PROCEDURE — 45385 COLONOSCOPY W/LESION REMOVAL: CPT

## 2022-07-12 PROCEDURE — 88305 TISSUE EXAM BY PATHOLOGIST: CPT

## 2022-07-12 PROCEDURE — 88305 TISSUE EXAM BY PATHOLOGIST: CPT | Mod: 26

## 2022-07-12 PROCEDURE — 82962 GLUCOSE BLOOD TEST: CPT

## 2022-07-12 PROCEDURE — 45380 COLONOSCOPY AND BIOPSY: CPT | Mod: PT

## 2022-07-12 RX ORDER — SODIUM CHLORIDE 9 MG/ML
500 INJECTION INTRAMUSCULAR; INTRAVENOUS; SUBCUTANEOUS
Refills: 0 | Status: COMPLETED | OUTPATIENT
Start: 2022-07-12 | End: 2022-07-12

## 2022-07-12 RX ADMIN — SODIUM CHLORIDE 30 MILLILITER(S): 9 INJECTION INTRAMUSCULAR; INTRAVENOUS; SUBCUTANEOUS at 11:36

## 2022-07-12 NOTE — ASU PATIENT PROFILE, ADULT - FALL HARM RISK - UNIVERSAL INTERVENTIONS
Bed in lowest position, wheels locked, appropriate side rails in place/Call bell, personal items and telephone in reach/Instruct patient to call for assistance before getting out of bed or chair/Non-slip footwear when patient is out of bed/[place] to call system/Physically safe environment - no spills, clutter or unnecessary equipment/Purposeful Proactive Rounding/Room/bathroom lighting operational, light cord in reach

## 2022-07-12 NOTE — ASU DISCHARGE PLAN (ADULT/PEDIATRIC) - NS MD DC FALL RISK RISK
For information on Fall & Injury Prevention, visit: [email or web address] OR  [email or web address] OR  https://www.cdc.gov/steadi/patient.html

## 2022-07-14 ENCOUNTER — NON-APPOINTMENT (OUTPATIENT)
Age: 52
End: 2022-07-14

## 2022-07-14 LAB — SURGICAL PATHOLOGY STUDY: SIGNIFICANT CHANGE UP

## 2022-08-24 ENCOUNTER — APPOINTMENT (OUTPATIENT)
Dept: GASTROENTEROLOGY | Facility: CLINIC | Age: 52
End: 2022-08-24

## 2022-08-24 VITALS
HEART RATE: 99 BPM | SYSTOLIC BLOOD PRESSURE: 137 MMHG | BODY MASS INDEX: 25.76 KG/M2 | OXYGEN SATURATION: 98 % | HEIGHT: 62 IN | TEMPERATURE: 97.2 F | DIASTOLIC BLOOD PRESSURE: 86 MMHG | WEIGHT: 140 LBS

## 2022-08-24 DIAGNOSIS — K59.04 CHRONIC IDIOPATHIC CONSTIPATION: ICD-10-CM

## 2022-08-24 DIAGNOSIS — K63.5 POLYP OF COLON: ICD-10-CM

## 2022-08-24 DIAGNOSIS — K64.8 OTHER HEMORRHOIDS: ICD-10-CM

## 2022-08-24 DIAGNOSIS — K21.9 GASTRO-ESOPHAGEAL REFLUX DISEASE W/OUT ESOPHAGITIS: ICD-10-CM

## 2022-08-24 DIAGNOSIS — R10.13 EPIGASTRIC PAIN: ICD-10-CM

## 2022-08-24 PROCEDURE — 99214 OFFICE O/P EST MOD 30 MIN: CPT

## 2022-08-24 RX ORDER — HYDROCORTISONE 25 MG/G
2.5 CREAM TOPICAL
Qty: 2 | Refills: 3 | Status: ACTIVE | COMMUNITY
Start: 2022-08-24 | End: 1900-01-01

## 2022-08-24 RX ORDER — LINACLOTIDE 72 UG/1
72 CAPSULE, GELATIN COATED ORAL
Qty: 30 | Refills: 3 | Status: ACTIVE | COMMUNITY
Start: 2022-08-24 | End: 1900-01-01

## 2022-08-24 RX ORDER — HYDROCORTISONE ACETATE 25 MG/1
25 SUPPOSITORY RECTAL
Qty: 30 | Refills: 3 | Status: ACTIVE | COMMUNITY
Start: 2022-08-24 | End: 1900-01-01

## 2022-08-24 NOTE — ASSESSMENT
[FreeTextEntry1] : Dyspepsia: The patient complains of dyspeptic symptoms.  The patient was advised to continue to abide by an anti-gas (low FOD-MAP) diet.  The patient was previously given a pamphlet for anti-gas (low FOD-MAP).  The patient and I reviewed the anti-gas (low FOD-MAP)diet at length again. The patient is to continue on a trial of Simethicone one tablet 4 times a day p.r.n. abdominal pain and gas.\par GERD: The patient was advised to avoid late-night meals and dietary indiscretions.  The patient was advised to avoid fried and fatty foods.  The patient was advised to abide by an anti-GERD diet. The patient was given a pamphlet for anti-GERD.  The patient and I reviewed the anti-GERD diet at length. I recommend a trial of famotidine 40 mg twice a day x 3 months for the symptoms.\par Constipation: The patient complains of constipation. I recommend a high-fiber diet. I recommend a trial of a probiotic such as Align once a day. I recommend a trial of Metamucil once a day for fiber supplementation. I recommend a trial of Linzess 72 mcg once a day for the constipation.  The patient agreed and will followup to reassess the symptoms.  \par Colonic Polyp: The patient was found to have colonic polyps on prior colonoscopy.  I recommend a repeat colonoscopy in 5 years to reassess for colonic polyps pending patient’s health unless symptomatic.  The patient agreed and will follow up for the procedure. \par Internal Hemorrhoids: The patient is to consider starting a trial of Anusol H. C. suppositories one per rectum nightly and Anusol HC2 .5% cream apply to affected area twice a day p.r.n. hemorrhoidal bleeding or pain. I also recommend a trial of Calmoseptine cream apply to affected area twice a day for hemorrhoidal discomfort.  I recommend Tucks pads for the hemorrhoids.  I recommend Sitz baths twice a day for the hemorrhoids.  I recommend avoid wearing tight underwear and use boxers.  I recommend avoid touching the perineal area. The patient agreed to followup. \par I recommend a repeat colonoscopy in 5 years to reassess for colonic polyps unless symptomatic.  The patient agreed and will follow up for the procedure. \par Prior Endoscopic Procedures: The patient had a prior upper endoscopy performed by another gastroenterologist. I will try to obtain the prior upper endoscopy reports. The patient is to sign a record release to obtain those records.\par Follow-up: The patient is to follow-up in the office in 1 yearto reassess the symptoms. The patient was told to call the office if any further problems. \par \par

## 2022-08-24 NOTE — HISTORY OF PRESENT ILLNESS
[None] : had no significant interval events [Nausea] : denies nausea [Vomiting] : denies vomiting [Diarrhea] : denies diarrhea [Yellow Skin Or Eyes (Jaundice)] : denies jaundice [Rectal Pain] : denies rectal pain [Wt Gain ___ Lbs] : recent [unfilled] ~Upound(s) weight gain [Heartburn] : heartburn [Constipation] : constipation [Abdominal Pain] : abdominal pain [Abdominal Swelling] : abdominal swelling [GERD] : gastroesophageal reflux disease [Wt Loss ___ Lbs] : no recent weight loss [Hiatus Hernia] : no hiatus hernia [Peptic Ulcer Disease] : no peptic ulcer disease [Pancreatitis] : no pancreatitis [Cholelithiasis] : no cholelithiasis [Kidney Stone] : no kidney stone [Inflammatory Bowel Disease] : no inflammatory bowel disease [Irritable Bowel Syndrome] : no irritable bowel syndrome [Diverticulitis] : no diverticulitis [Alcohol Abuse] : no alcohol abuse [Malignancy] : no malignancy [Abdominal Surgery] : no abdominal surgery [Appendectomy] : no appendectomy [Cholecystectomy] : no cholecystectomy [de-identified] : The patient has a history significant for hypertension, hypercholesterolemia and diabetes mellitus. The patient states that she is feeling fine. The patient denies any jaundice or pruritus.  The patient denies any chronic lower back pain. The patient denies any abdominal pain.  The patient complains of abdominal gas and bloating.  The patient denies any nausea or vomiting.  The patient complains of occasional gastroesophageal reflux disease but denies any dysphagia.  The gastroesophageal reflux disease is worse after meals and late at night and in the early morning. The gastroesophageal reflux disease is improved with proton pump inhibitors, H2 blockers and antacids.   The patient denies any atypical chest pain, shortness of breath or palpitations.  The patient admits to occasional episodes of diaphoresis.  The patient complains of constipation but denies any diarrhea.  The patient has 1 bowel movement a day. The patient complains of a change in bowel habits.  The patient complains of a change in caliber of stool. The patient denies having mucus discharge with the bowel movements.  The patient denies any bright red blood per rectum, melena or hematemesis.  The patient denies any rectal pain or rectal pruritus.  The patient denies any weight loss or anorexia.  The patient admits to gaining weight recently.  She denies any fevers or chills.  The patient had a colonoscopy to the terminal ileum performed at the [facility] endoscopy suite on July 14, 2022. The colonoscopy to the terminal ileum revealed a cecal polyp and small internal hemorrhoids.  The colonic polyp was snared and removed. There was no bleeding post polypectomy. There were no other polyps, masses, diverticulosis, AVMs or colitis noted.  The colonic pathology performed on July 14, 2022 revealed a hyperplastic polyp (cecal polyp). The patient tolerated the procedure well.  The patient had an upper endoscopy at the [facility] endoscopy suite on February 13, 2019. The upper endoscopy was performed up to the level of the second portion of the duodenum. The upper endoscopy revealed very mild gastritis. Biopsies were taken of the distal esophagus, antrum, body of stomach and duodenum to assess for esophagitis, gastritis and duodenitis. The pathology revealed esophageal mucosa with unremarkable squamous mucosa and detached fragments of mildly inflamed gastric type gastric mucosa (esophagus), moderate to severe chronic active gastritis that was negative for Helicobacter pylori (antrum and body of the stomach) and duodenal mucosa without significant pathology (duodenum). The patient tolerated the procedure well. The patient is being followed by her cardiologist. According to the patient, the cardiac status is stable.   The patient admits to having a prior upper endoscopy performed by another gastroenterologist, Dr. Ez Aguayo ([phone]). According to the patient, the upper endoscopic findings revealed gastritis and gastroparesis. The patient is currently on Reglan for the gastroparesis. The patient was evaluated at [facility] emergency room on March 23, 2022 for abdominal discomfort and abdominal gas and bloating. The patient admits to a history of gastroparesis.  The patient had a prior upper endoscopy performed to assess the symptoms. The patient had blood work performed in the emergency room to assess the symptoms. The blood work revealed an elevated blood glucose of 254 mg/dL.  The patient is being followed by her cardiologist. According to the patient, the cardiac status is stable. The patient admits to a family history of GI problems. The patient’s father had a history of stomach cancer.  [de-identified] : (-) smoking, (-) ETOH, (-) IVDA\par

## 2022-09-07 ENCOUNTER — TRANSCRIPTION ENCOUNTER (OUTPATIENT)
Age: 52
End: 2022-09-07

## 2022-09-24 NOTE — ASU DISCHARGE PLAN (ADULT/PEDIATRIC) - SIGNS AND SYMPTOMS OF INFECTION: FEVER, REDNESS, SWELLING, FOUL SMELLING DISCHARGE
[facility] - 5th Floor Med Surg  Cardiology  Progress Note    Patient Name: Jose George  MRN: [id number]  Admission Date: 9/23/2022  Hospital Length of Stay: 0 days  Code Status: Full Code   Attending Physician: Sergio Huerta MD   Primary Care Physician: Jeremy Mina MD  Expected Discharge Date:   Principal Problem:<principal problem not specified>    Subjective:     Brief HPI:   Mr. George is an 89 year old, unknown to [facility], who presented to [facility] on 9.23.22 with c/o shortness of breath. He stated that he has been having symptoms for 2 weeks. He has a history of COPD, DM 2, and chronic bronchitis. He was admitted yesterday for dx of NSTEMI but left AMA due to his symptoms improving. Upon workup, his troponin was 0.259, BNP was 817, CT of the chest revealed no PE-moderate right and small left pleural effusions with mild pulmonary edema. [facility] is being consulted for CHF.    9.24.22 -800 ml fluid balance. Patient stated that SOB has improved.    Hospital Course:   PMH: COPD, DM 2, OA, HTN, HLD, Gout, hypothyroidism, spinal stenosis, and chronic bronchitis  PSH: Knee surgery, cholecystectomy, and mandible surgery  Family History: Mother-HTN, CHF; Father-cancer, PE  Social History: Tobacco-denies; Alcohol-denies; Illicit drug use-denies     Previous Cardiac Diagnostics:   Echo 9.23.22  The left ventricle is normal in size with mildly decreased systolic function.  The estimated ejection fraction is 40%.  Unable to accurately assess diastolic function due to underlying AFIB rhythm.  Normal right ventricular size with mildly reduced right ventricular systolic function.  Marked Aortic valve sclerosis without stenosis.  Mitral annular calcification is present without significant stenosis.  The MV appears partially flail resulting in Moderate-severe eccentric anteriorly directed jet of MR.  Mild tricuspid regurgitation.  Mild left atrial enlargement.  Mild right atrial enlargement      Venous Doppler BLE  9.23.22  No evidence of deep or superficial vein thrombosis in the BLE    Review of Systems   Respiratory:  Positive for shortness of breath.    All other systems reviewed and are negative.    Objective:     Vital Signs (Most Recent):  Temp: 98 °F (36.7 °C) (09/24/22 0740)  Pulse: 71 (09/24/22 0740)  Resp: 20 (09/24/22 0740)  BP: (!) 135/93 (09/24/22 0740)  SpO2: (!) 90 % (09/24/22 0740)   Vital Signs (24h Range):  Temp:  [97.7 °F (36.5 °C)-98 °F (36.7 °C)] 98 °F (36.7 °C)  Pulse:  [71-92] 71  Resp:  [2-20] 20  SpO2:  [90 %-96 %] 90 %  BP: (130-162)/() 135/93     Weight: 83.9 kg (185 lb)  Body mass index is 29.04 kg/m².    SpO2: (!) 90 %  O2 Device (Oxygen Therapy): room air      Intake/Output Summary (Last 24 hours) at 9/24/2022 1055  Last data filed at 9/24/2022 0919  Gross per 24 hour   Intake 60 ml   Output 2650 ml   Net -2590 ml       Lines/Drains/Airways       Peripheral Intravenous Line  Duration                  Peripheral IV - Single Lumen 09/23/22 1108 20 G Left Antecubital <1 day                    Significant Labs:   Recent Results (from the past 72 hour(s))   COVID/FLU A&B PCR    Collection Time: 09/22/22  2:06 PM   Result Value Ref Range    Influenza A PCR Not Detected Not Detected    Influenza B PCR Not Detected Not Detected    SARS-CoV-2 PCR Not Detected Not Detected   Comprehensive metabolic panel    Collection Time: 09/22/22  2:24 PM   Result Value Ref Range    Sodium Level 141 136 - 145 mmol/L    Potassium Level 3.7 3.5 - 5.1 mmol/L    Chloride 104 98 - 107 mmol/L    Carbon Dioxide 24 23 - 31 mmol/L    Glucose Level 170 (H) 82 - 115 mg/dL    Blood Urea Nitrogen 17.7 8.4 - 25.7 mg/dL    Creatinine 1.01 0.73 - 1.18 mg/dL    Calcium Level Total 9.1 8.8 - 10.0 mg/dL    Protein Total 7.0 5.8 - 7.6 gm/dL    Albumin Level 3.3 (L) 3.4 - 4.8 gm/dL    Globulin 3.7 (H) 2.4 - 3.5 gm/dL    Albumin/Globulin Ratio 0.9 (L) 1.1 - 2.0 ratio    Bilirubin Total 1.6 (H) <=1.5 mg/dL    Alkaline Phosphatase 205  (H) 40 - 150 unit/L    Alanine Aminotransferase 45 0 - 55 unit/L    Aspartate Aminotransferase 32 5 - 34 unit/L    eGFR >60 mls/min/1.73/m2   Troponin I #1    Collection Time: 09/22/22  2:24 PM   Result Value Ref Range    Troponin-I 0.263 (H) 0.000 - 0.045 ng/mL   B-Type natriuretic peptide (BNP)    Collection Time: 09/22/22  2:24 PM   Result Value Ref Range    Natriuretic Peptide 565.4 (H) <=100.0 pg/mL   CBC with Differential    Collection Time: 09/22/22  2:24 PM   Result Value Ref Range    WBC 9.7 4.5 - 11.5 x10(3)/mcL    RBC 4.11 (L) 4.70 - 6.10 x10(6)/mcL    Hgb 13.2 (L) 14.0 - 18.0 gm/dL    Hct 41.4 (L) 42.0 - 52.0 %    .7 (H) 80.0 - 94.0 fL    MCH 32.1 (H) 27.0 - 31.0 pg    MCHC 31.9 (L) 33.0 - 36.0 mg/dL    RDW 14.4 11.5 - 17.0 %    Platelet 379 130 - 400 x10(3)/mcL    MPV 9.2 7.4 - 10.4 fL    Neut % 75.4 %    Lymph % 15.4 %    Mono % 7.1 %    Eos % 1.2 %    Basophil % 0.4 %    Lymph # 1.49 0.6 - 4.6 x10(3)/mcL    Neut # 7.3 2.1 - 9.2 x10(3)/mcL    Mono # 0.69 0.1 - 1.3 x10(3)/mcL    Eos # 0.12 0 - 0.9 x10(3)/mcL    Baso # 0.04 0 - 0.2 x10(3)/mcL    IG# 0.05 (H) 0 - 0.04 x10(3)/mcL    IG% 0.5 %    NRBC% 0.0 %   Comprehensive metabolic panel    Collection Time: 09/23/22 10:20 AM   Result Value Ref Range    Sodium Level 142 136 - 145 mmol/L    Potassium Level 4.1 3.5 - 5.1 mmol/L    Chloride 106 98 - 107 mmol/L    Carbon Dioxide 26 23 - 31 mmol/L    Glucose Level 215 (H) 82 - 115 mg/dL    Blood Urea Nitrogen 25.0 8.4 - 25.7 mg/dL    Creatinine 1.20 (H) 0.73 - 1.18 mg/dL    Calcium Level Total 9.2 8.8 - 10.0 mg/dL    Protein Total 7.1 5.8 - 7.6 gm/dL    Albumin Level 3.4 3.4 - 4.8 gm/dL    Globulin 3.7 (H) 2.4 - 3.5 gm/dL    Albumin/Globulin Ratio 0.9 (L) 1.1 - 2.0 ratio    Bilirubin Total 1.6 (H) <=1.5 mg/dL    Alkaline Phosphatase 183 (H) 40 - 150 unit/L    Alanine Aminotransferase 39 0 - 55 unit/L    Aspartate Aminotransferase 24 5 - 34 unit/L    eGFR 59 mls/min/1.73/m2   Troponin I #1    Collection  Time: 09/23/22 10:20 AM   Result Value Ref Range    Troponin-I 0.259 (H) 0.000 - 0.045 ng/mL   B-Type natriuretic peptide (BNP)    Collection Time: 09/23/22 10:20 AM   Result Value Ref Range    Natriuretic Peptide 817.7 (H) <=100.0 pg/mL   CBC with Differential    Collection Time: 09/23/22 10:20 AM   Result Value Ref Range    WBC 8.0 4.5 - 11.5 x10(3)/mcL    RBC 4.12 (L) 4.70 - 6.10 x10(6)/mcL    Hgb 12.8 (L) 14.0 - 18.0 gm/dL    Hct 41.4 (L) 42.0 - 52.0 %    .5 (H) 80.0 - 94.0 fL    MCH 31.1 (H) 27.0 - 31.0 pg    MCHC 30.9 (L) 33.0 - 36.0 mg/dL    RDW 14.6 11.5 - 17.0 %    Platelet 406 (H) 130 - 400 x10(3)/mcL    MPV 9.6 7.4 - 10.4 fL    Neut % 83.0 %    Lymph % 11.0 %    Mono % 5.4 %    Eos % 0.0 %    Basophil % 0.1 %    Lymph # 0.88 0.6 - 4.6 x10(3)/mcL    Neut # 6.6 2.1 - 9.2 x10(3)/mcL    Mono # 0.43 0.1 - 1.3 x10(3)/mcL    Eos # 0.00 0 - 0.9 x10(3)/mcL    Baso # 0.01 0 - 0.2 x10(3)/mcL    IG# 0.04 0 - 0.04 x10(3)/mcL    IG% 0.5 %    NRBC% 0.0 %   TSH    Collection Time: 09/23/22 10:20 AM   Result Value Ref Range    Thyroid Stimulating Hormone 2.3422 0.3500 - 4.9400 uIU/mL   D dimer, quantitative    Collection Time: 09/23/22 10:49 AM   Result Value Ref Range    D-Dimer 1.46 (H) 0.00 - 0.50 ug/mL FEU   Echo    Collection Time: 09/23/22  5:12 PM   Result Value Ref Range    BSA 1.99 m2    Right Atrial Pressure (from IVC) 8 mmHg    EF 40 %    Left Ventricular Outflow Tract Mean Velocity 0.53 cm/s    Left Ventricular Outflow Tract Mean Gradient 1.00 mmHg    AORTIC VALVE CUSP SEPERATION 1.70 cm    LVIDd 4.66 3.5 - 6.0 cm    IVS 1.06 0.6 - 1.1 cm    Posterior Wall 1.08 0.6 - 1.1 cm    LVIDs 3.67 2.1 - 4.0 cm    FS 21 28 - 44 %    Sinus 3.50 cm    LV mass 178.01 g    LA size 4.30 cm    RVDD 3.50 cm    TAPSE 1.35 cm    Left Ventricle Relative Wall Thickness 0.46 cm    AV mean gradient 6 mmHg    AV valve area 1.52 cm2    AV Velocity Ratio 0.52     AV index (prosthetic) 0.48     MV mean gradient 2 mmHg    MV valve  area by continuity eq 1.52 cm2    LVOT diameter 2.00 cm    LVOT area 3.1 cm2    LVOT peak felix 0.81 m/s    LVOT peak VTI 12.00 cm    Ao peak felix 1.57 m/s    Ao VTI 24.8 cm    LVOT stroke volume 37.68 cm3    AV peak gradient 10 mmHg    MV peak gradient 4 mmHg    MV Peak E Felix 1.11 m/s    MV VTI 24.8 cm    LV Systolic Volume 57.00 mL    LV Systolic Volume Index 29.2 mL/m2    LV Diastolic Volume 100.00 mL    LV Diastolic Volume Index 51.28 mL/m2    LV Mass Index 91 g/m2    LA Volume Index (Mod) 47.1 mL/m2    LA volume (mod) 91.80 cm3   Troponin I    Collection Time: 09/23/22  6:49 PM   Result Value Ref Range    Troponin-I 0.320 (H) 0.000 - 0.045 ng/mL   Troponin I    Collection Time: 09/23/22 10:36 PM   Result Value Ref Range    Troponin-I 0.305 (H) 0.000 - 0.045 ng/mL   Magnesium    Collection Time: 09/24/22  5:54 AM   Result Value Ref Range    Magnesium Level 1.90 1.60 - 2.60 mg/dL   Comprehensive Metabolic Panel    Collection Time: 09/24/22  5:54 AM   Result Value Ref Range    Sodium Level 143 136 - 145 mmol/L    Potassium Level 3.4 (L) 3.5 - 5.1 mmol/L    Chloride 101 98 - 107 mmol/L    Carbon Dioxide 31 23 - 31 mmol/L    Glucose Level 148 (H) 82 - 115 mg/dL    Blood Urea Nitrogen 21.7 8.4 - 25.7 mg/dL    Creatinine 1.13 0.73 - 1.18 mg/dL    Calcium Level Total 9.0 8.8 - 10.0 mg/dL    Protein Total 6.1 5.8 - 7.6 gm/dL    Albumin Level 3.1 (L) 3.4 - 4.8 gm/dL    Globulin 3.0 2.4 - 3.5 gm/dL    Albumin/Globulin Ratio 1.0 (L) 1.1 - 2.0 ratio    Bilirubin Total 1.6 (H) <=1.5 mg/dL    Alkaline Phosphatase 153 (H) 40 - 150 unit/L    Alanine Aminotransferase 35 0 - 55 unit/L    Aspartate Aminotransferase 23 5 - 34 unit/L    eGFR >60 mls/min/1.73/m2   Lipid Panel    Collection Time: 09/24/22  5:54 AM   Result Value Ref Range    Cholesterol Total 149 <=200 mg/dL    HDL Cholesterol 39 35 - 60 mg/dL    Triglyceride 95 34 - 140 mg/dL    Cholesterol/HDL Ratio 4 0 - 5    Very Low Density Lipoprotein 19     LDL Cholesterol 91.00  50.00 - 140.00 mg/dL   Troponin I    Collection Time: 09/24/22  5:54 AM   Result Value Ref Range    Troponin-I 0.272 (H) 0.000 - 0.045 ng/mL   CBC with Differential    Collection Time: 09/24/22  5:54 AM   Result Value Ref Range    WBC 8.3 4.5 - 11.5 x10(3)/mcL    RBC 4.02 (L) 4.70 - 6.10 x10(6)/mcL    Hgb 12.5 (L) 14.0 - 18.0 gm/dL    Hct 39.9 (L) 42.0 - 52.0 %    MCV 99.3 (H) 80.0 - 94.0 fL    MCH 31.1 (H) 27.0 - 31.0 pg    MCHC 31.3 (L) 33.0 - 36.0 mg/dL    RDW 14.6 11.5 - 17.0 %    Platelet 346 130 - 400 x10(3)/mcL    MPV 9.5 7.4 - 10.4 fL    Neut % 62.8 %    Lymph % 25.0 %    Mono % 8.0 %    Eos % 3.1 %    Basophil % 0.6 %    Lymph # 2.08 0.6 - 4.6 x10(3)/mcL    Neut # 5.2 2.1 - 9.2 x10(3)/mcL    Mono # 0.67 0.1 - 1.3 x10(3)/mcL    Eos # 0.26 0 - 0.9 x10(3)/mcL    Baso # 0.05 0 - 0.2 x10(3)/mcL    IG# 0.04 0 - 0.04 x10(3)/mcL    IG% 0.5 %    NRBC% 0.0 %       Telemetry:  Atrial fibrillation    Physical Exam  Vitals reviewed.   Constitutional:       Appearance: Normal appearance.   Cardiovascular:      Rate and Rhythm: Normal rate. Rhythm irregular.      Pulses: Normal pulses.      Heart sounds: Normal heart sounds.   Pulmonary:      Effort: Pulmonary effort is normal.      Breath sounds: Normal breath sounds.      Comments: BBS with scattered crackles to the bases.  Abdominal:      General: Bowel sounds are normal.      Palpations: Abdomen is soft.   Musculoskeletal:         General: Normal range of motion.   Skin:     General: Skin is warm and dry.      Capillary Refill: Capillary refill takes less than 2 seconds.   Neurological:      Mental Status: He is alert and oriented to person, place, and time.       Current Inpatient Medications:    Current Facility-Administered Medications:     acetaminophen tablet 650 mg, 650 mg, Oral, Q8H PRN, JOSETTE Verdin    acetaminophen tablet 650 mg, 650 mg, Oral, Q4H PRN, JOSETTE Verdin    enoxaparin injection 80 mg, 1 mg/kg, Subcutaneous, Q12H, Annalise  SHUKRI Qiu, FNP, 80 mg at 09/24/22 0612    furosemide injection 40 mg, 40 mg, Intravenous, Q12H, Annalise WATT Lebas, FNP, 40 mg at 09/24/22 0611    magnesium sulfate in dextrose IVPB (premix) 1 g, 1 g, Intravenous, Once, Annalise Qiu FNP    metoprolol tartrate (LOPRESSOR) tablet 25 mg, 25 mg, Oral, BID, Annalsie Torresbas, FNP, 25 mg at 09/24/22 0916    ondansetron injection 4 mg, 4 mg, Intravenous, Q8H PRN, Kaitlin Noguera AGACNP-BC    potassium chloride SA CR tablet 40 mEq, 40 mEq, Oral, Once, ADÁN CoronelP    VTE Risk Mitigation (From admission, onward)           Ordered     enoxaparin injection 80 mg  Every 12 hours (non-standard times)         09/23/22 1515                    Assessment:   Impression:  Acute Systolic Heart Failure   -EF 40%  Elevated BNP  NSTEMI probable type II in the setting of acute heart failure exacerbation  VHD   -MV partially flailing resulting in mod-severe eccentric anteriorly directed jet of MR  Bilateral pleural effusions and mild pulmonary edema per CT of the chest  NSTEMI probable type II in the setting of CHF exacerbation  Atrial Fibrillation new onset                   -CHADS-Vasc 5  HTN  HLD  OA  Gout  Hypothyroidism  Spinal stenosis  COPD  Hypokalemia  Hypomagnesemia    Plan:   Replete Mag and K  Continue metoprolol tartrate 25 mg po bid  Continue full dose lovenox for elevated Chads score-patient will need to be placed on eliquis 5 mg po bid  Continue lasix 40 mg IV BID  Keep K>4 and Mag>2  Strict I&O  Daily weights  Patient will need ischemic workup as an outpatient and SCOTT to determine MV disease. He wants to be d/c'd tomorrow due to his wife having alzheimer's and needing his care.  CBC/CMP/Mag level       KRYSTEN Coronel  Cardiology  [facility] - 5th Floor Med Surg  09/24/2022    Statement Selected

## 2022-12-28 ENCOUNTER — RX RENEWAL (OUTPATIENT)
Age: 52
End: 2022-12-28

## 2023-01-16 ENCOUNTER — NON-APPOINTMENT (OUTPATIENT)
Age: 53
End: 2023-01-16

## 2023-03-29 ENCOUNTER — OUTPATIENT (OUTPATIENT)
Dept: OUTPATIENT SERVICES | Facility: HOSPITAL | Age: 53
LOS: 1 days | Discharge: HOME | End: 2023-03-29
Payer: COMMERCIAL

## 2023-03-29 VITALS — HEIGHT: 63 IN | WEIGHT: 149.91 LBS

## 2023-03-29 DIAGNOSIS — R07.9 CHEST PAIN, UNSPECIFIED: ICD-10-CM

## 2023-03-29 DIAGNOSIS — Z90.721 ACQUIRED ABSENCE OF OVARIES, UNILATERAL: Chronic | ICD-10-CM

## 2023-03-29 LAB
ALBUMIN SERPL ELPH-MCNC: 4 G/DL — SIGNIFICANT CHANGE UP (ref 3.3–5)
ALP SERPL-CCNC: 57 U/L — SIGNIFICANT CHANGE UP (ref 40–120)
ALT FLD-CCNC: 75 U/L — HIGH (ref 4–33)
ANION GAP SERPL CALC-SCNC: 12 MMOL/L — SIGNIFICANT CHANGE UP (ref 7–14)
AST SERPL-CCNC: 68 U/L — HIGH (ref 4–32)
BILIRUB SERPL-MCNC: 0.2 MG/DL — SIGNIFICANT CHANGE UP (ref 0.2–1.2)
BUN SERPL-MCNC: 13 MG/DL — SIGNIFICANT CHANGE UP (ref 7–23)
CALCIUM SERPL-MCNC: 9.7 MG/DL — SIGNIFICANT CHANGE UP (ref 8.4–10.5)
CHLORIDE SERPL-SCNC: 105 MMOL/L — SIGNIFICANT CHANGE UP (ref 98–107)
CO2 SERPL-SCNC: 22 MMOL/L — SIGNIFICANT CHANGE UP (ref 22–31)
CREAT SERPL-MCNC: 0.64 MG/DL — SIGNIFICANT CHANGE UP (ref 0.5–1.3)
EGFR: 107 ML/MIN/1.73M2 — SIGNIFICANT CHANGE UP
GLUCOSE SERPL-MCNC: 159 MG/DL — HIGH (ref 70–99)
HCG UR QL: NEGATIVE — SIGNIFICANT CHANGE UP
HCT VFR BLD CALC: 35.1 % — SIGNIFICANT CHANGE UP (ref 34.5–45)
HGB BLD-MCNC: 11.4 G/DL — LOW (ref 11.5–15.5)
MAGNESIUM SERPL-MCNC: 1.7 MG/DL — SIGNIFICANT CHANGE UP (ref 1.6–2.6)
MCHC RBC-ENTMCNC: 29.2 PG — SIGNIFICANT CHANGE UP (ref 27–34)
MCHC RBC-ENTMCNC: 32.5 GM/DL — SIGNIFICANT CHANGE UP (ref 32–36)
MCV RBC AUTO: 89.8 FL — SIGNIFICANT CHANGE UP (ref 80–100)
NRBC # BLD: 0 /100 WBCS — SIGNIFICANT CHANGE UP (ref 0–0)
NRBC # FLD: 0 K/UL — SIGNIFICANT CHANGE UP (ref 0–0)
PHOSPHATE SERPL-MCNC: 4.1 MG/DL — SIGNIFICANT CHANGE UP (ref 2.5–4.5)
PLATELET # BLD AUTO: 180 K/UL — SIGNIFICANT CHANGE UP (ref 150–400)
POTASSIUM SERPL-MCNC: 4.1 MMOL/L — SIGNIFICANT CHANGE UP (ref 3.5–5.3)
POTASSIUM SERPL-SCNC: 4.1 MMOL/L — SIGNIFICANT CHANGE UP (ref 3.5–5.3)
PROT SERPL-MCNC: 6.8 G/DL — SIGNIFICANT CHANGE UP (ref 6–8.3)
RBC # BLD: 3.91 M/UL — SIGNIFICANT CHANGE UP (ref 3.8–5.2)
RBC # FLD: 12.4 % — SIGNIFICANT CHANGE UP (ref 10.3–14.5)
SODIUM SERPL-SCNC: 139 MMOL/L — SIGNIFICANT CHANGE UP (ref 135–145)
WBC # BLD: 5.95 K/UL — SIGNIFICANT CHANGE UP (ref 3.8–10.5)
WBC # FLD AUTO: 5.95 K/UL — SIGNIFICANT CHANGE UP (ref 3.8–10.5)

## 2023-03-29 PROCEDURE — 99152 MOD SED SAME PHYS/QHP 5/>YRS: CPT

## 2023-03-29 PROCEDURE — 93010 ELECTROCARDIOGRAM REPORT: CPT

## 2023-03-29 PROCEDURE — 93454 CORONARY ARTERY ANGIO S&I: CPT | Mod: 26,59

## 2023-03-29 RX ORDER — INSULIN GLARGINE 100 [IU]/ML
60 INJECTION, SOLUTION SUBCUTANEOUS
Refills: 0 | DISCHARGE

## 2023-03-29 RX ORDER — METFORMIN HYDROCHLORIDE 850 MG/1
1 TABLET ORAL
Refills: 0 | DISCHARGE

## 2023-03-29 RX ORDER — CARVEDILOL PHOSPHATE 80 MG/1
1 CAPSULE, EXTENDED RELEASE ORAL
Refills: 0 | DISCHARGE

## 2023-03-29 RX ORDER — ATORVASTATIN CALCIUM 80 MG/1
1 TABLET, FILM COATED ORAL
Refills: 0 | DISCHARGE

## 2023-03-29 RX ORDER — SODIUM CHLORIDE 9 MG/ML
3 INJECTION INTRAMUSCULAR; INTRAVENOUS; SUBCUTANEOUS EVERY 8 HOURS
Refills: 0 | Status: DISCONTINUED | OUTPATIENT
Start: 2023-03-29 | End: 2023-04-13

## 2023-03-29 RX ORDER — INSULIN ASPART 100 [IU]/ML
40 INJECTION, SOLUTION SUBCUTANEOUS
Refills: 0 | DISCHARGE

## 2023-03-29 RX ORDER — FENOFIBRATE,MICRONIZED 130 MG
1 CAPSULE ORAL
Refills: 0 | DISCHARGE

## 2023-03-29 RX ORDER — LOSARTAN POTASSIUM 100 MG/1
1 TABLET, FILM COATED ORAL
Refills: 0 | DISCHARGE

## 2023-03-29 RX ORDER — NORTRIPTYLINE HYDROCHLORIDE 10 MG/1
1 CAPSULE ORAL
Refills: 0 | DISCHARGE

## 2023-03-29 RX ORDER — ASPIRIN/CALCIUM CARB/MAGNESIUM 324 MG
1 TABLET ORAL
Refills: 0 | DISCHARGE

## 2023-03-29 NOTE — H&P CARDIOLOGY - NSICDXFAMILYHX_GEN_ALL_CORE_FT
FAMILY HISTORY:  Mother  Still living? No  Family history of MI (myocardial infarction), Age at diagnosis: Age Unknown    Sibling  Still living? No  Family history of MI (myocardial infarction), Age at diagnosis: Age Unknown

## 2023-03-29 NOTE — H&P CARDIOLOGY - HISTORY OF PRESENT ILLNESS
52 y/o female with a PMHx of HTN, HLD, DM and depression presents for elective cardiac catheterization. Pt has been experiencing intermittent, 9/10, non-pleuritic, non-radiating, exertional substernal chest pain for the past few months. Pt describes the chest pain as a "pressure," which is made worse with activity, such as walking up a flight of stairs or ambulating 2-3 blocks. Pt reports that her symptoms are made better with rest. Pt has never developed chest pain at rest. Pt admits to associated dyspnea on exertion, with symptoms again made better with rest. Pt saw her cardiologist, Dr. Paul, and underwent exercise stress test, which reportedly showed an "abnormality." Of note, pt has a family history significant for premature CAD, as her mother and brother both  of a heart attack in their 50s. Pt denies fever, chills, recent travel, headache, dizziness, visual deficits, orthopnea, palpitations, abdominal pain, N/V/D/C, hematochezia, melena, dysuria, hematuria, LOC, syncope, peripheral edema. In light of patients cardiac risk factors, symptoms, and abnormal noninvasive test findings, there is high suspicion for CAD. Patient is now referred to [facility] for a cardiac catheterization with possible PTCA/stent.     PCP: Dr. Kylie Richmond  Cardiologist: Dr. Alexi Paul

## 2023-03-29 NOTE — H&P CARDIOLOGY - NSICDXPASTMEDICALHX_GEN_ALL_CORE_FT
PAST MEDICAL HISTORY:  DM (diabetes mellitus)     HLD (hyperlipidemia)     HTN (hypertension)     Other depression

## 2023-11-17 ENCOUNTER — RX RENEWAL (OUTPATIENT)
Age: 53
End: 2023-11-17

## 2023-11-17 RX ORDER — FAMOTIDINE 40 MG/1
40 TABLET, FILM COATED ORAL
Qty: 60 | Refills: 3 | Status: ACTIVE | COMMUNITY
Start: 2022-08-24 | End: 1900-01-01

## (undated) DEVICE — TUBING CANNULA SALTER LABS NASAL ADULT 7FT

## (undated) DEVICE — LUBE JELLY FOILPACK 36GM STERILE

## (undated) DEVICE — SOL INJ NS 0.9% 500ML 1-PORT

## (undated) DEVICE — DRSG GAUZE 4X4"

## (undated) DEVICE — FORCEP RADIAL JAW 4 JUMBO NO NDL DISP

## (undated) DEVICE — SOLIDIFIER ISOLYZER 2000 CC

## (undated) DEVICE — ADAPTER ENDO CHNL SINGLE USE

## (undated) DEVICE — FORMALIN CUPS 10% BUFFERED

## (undated) DEVICE — RETRIEVER ROTH NET PLATINUM-UNIVERSAL

## (undated) DEVICE — TUBING IV SET GRAVITY 3Y 100" MACRO

## (undated) DEVICE — SNARE LOOP POLY DISP 30MM LOOP

## (undated) DEVICE — SENSOR O2 FINGER ADULT 24/CA

## (undated) DEVICE — SYR LUER LOK 50CC

## (undated) DEVICE — NDL INJ SCLERO INTERJECT 23G

## (undated) DEVICE — TUBING MEDI-VAC W MAXIGRIP CONNECTORS 1/4"X6'

## (undated) DEVICE — CLAMP BX HOT RAD JAW 3

## (undated) DEVICE — KIT ENDO PROCEDURE CUST W/VLV

## (undated) DEVICE — CATH ELCTR GLIDE PRB 7FR

## (undated) DEVICE — FORCEP BIOPSY 2.5MM DISP

## (undated) DEVICE — VALVE ENDOSCOPE DEFENDO SINGLE USE